# Patient Record
Sex: FEMALE | Race: WHITE | NOT HISPANIC OR LATINO | Employment: OTHER | URBAN - METROPOLITAN AREA
[De-identification: names, ages, dates, MRNs, and addresses within clinical notes are randomized per-mention and may not be internally consistent; named-entity substitution may affect disease eponyms.]

---

## 2016-04-26 LAB — THIN PREP: NOT DETECTED

## 2017-04-10 RX ORDER — LEVOTHYROXINE SODIUM 50 UG/1
50 TABLET ORAL DAILY
Qty: 30 TABLET | Refills: 0 | Status: SHIPPED | OUTPATIENT
Start: 2017-04-10 | End: 2018-09-28 | Stop reason: SDUPTHER

## 2017-04-10 RX ORDER — ATORVASTATIN CALCIUM 20 MG/1
20 TABLET, FILM COATED ORAL DAILY
Qty: 30 TABLET | Refills: 0 | Status: SHIPPED | OUTPATIENT
Start: 2017-04-10 | End: 2017-06-06 | Stop reason: SDUPTHER

## 2017-04-12 ENCOUNTER — LAB VISIT (OUTPATIENT)
Dept: LAB | Facility: HOSPITAL | Age: 52
End: 2017-04-12
Attending: INTERNAL MEDICINE
Payer: COMMERCIAL

## 2017-04-12 DIAGNOSIS — Z00.00 ANNUAL PHYSICAL EXAM: ICD-10-CM

## 2017-04-12 LAB
ALBUMIN SERPL BCP-MCNC: 4.2 G/DL
ALP SERPL-CCNC: 65 U/L
ALT SERPL W/O P-5'-P-CCNC: 15 U/L
ANION GAP SERPL CALC-SCNC: 11 MMOL/L
AST SERPL-CCNC: 22 U/L
BILIRUB SERPL-MCNC: 0.5 MG/DL
BUN SERPL-MCNC: 16 MG/DL
CALCIUM SERPL-MCNC: 10.3 MG/DL
CHLORIDE SERPL-SCNC: 102 MMOL/L
CHOLEST/HDLC SERPL: 3.6 {RATIO}
CO2 SERPL-SCNC: 27 MMOL/L
CREAT SERPL-MCNC: 0.9 MG/DL
EST. GFR  (AFRICAN AMERICAN): >60 ML/MIN/1.73 M^2
EST. GFR  (NON AFRICAN AMERICAN): >60 ML/MIN/1.73 M^2
GLUCOSE SERPL-MCNC: 94 MG/DL
HDL/CHOLESTEROL RATIO: 27.6 %
HDLC SERPL-MCNC: 203 MG/DL
HDLC SERPL-MCNC: 56 MG/DL
LDLC SERPL CALC-MCNC: 103.4 MG/DL
NONHDLC SERPL-MCNC: 147 MG/DL
POTASSIUM SERPL-SCNC: 5.1 MMOL/L
PROT SERPL-MCNC: 8.2 G/DL
SODIUM SERPL-SCNC: 140 MMOL/L
TRIGL SERPL-MCNC: 218 MG/DL
TSH SERPL DL<=0.005 MIU/L-ACNC: 3.51 UIU/ML

## 2017-04-12 PROCEDURE — 36415 COLL VENOUS BLD VENIPUNCTURE: CPT | Mod: PO

## 2017-04-12 PROCEDURE — 84443 ASSAY THYROID STIM HORMONE: CPT

## 2017-04-12 PROCEDURE — 80053 COMPREHEN METABOLIC PANEL: CPT

## 2017-04-12 PROCEDURE — 80061 LIPID PANEL: CPT

## 2017-05-17 ENCOUNTER — PATIENT OUTREACH (OUTPATIENT)
Dept: ADMINISTRATIVE | Facility: HOSPITAL | Age: 52
End: 2017-05-17

## 2017-06-01 ENCOUNTER — OFFICE VISIT (OUTPATIENT)
Dept: INTERNAL MEDICINE | Facility: CLINIC | Age: 52
End: 2017-06-01
Attending: INTERNAL MEDICINE
Payer: COMMERCIAL

## 2017-06-01 ENCOUNTER — HOSPITAL ENCOUNTER (OUTPATIENT)
Dept: CARDIOLOGY | Facility: OTHER | Age: 52
Discharge: HOME | End: 2017-06-01
Attending: INTERNAL MEDICINE
Payer: COMMERCIAL

## 2017-06-01 VITALS
HEIGHT: 65 IN | SYSTOLIC BLOOD PRESSURE: 126 MMHG | DIASTOLIC BLOOD PRESSURE: 81 MMHG | HEART RATE: 89 BPM | WEIGHT: 160.94 LBS | BODY MASS INDEX: 26.81 KG/M2 | OXYGEN SATURATION: 97 %

## 2017-06-01 DIAGNOSIS — E78.2 MIXED HYPERLIPIDEMIA: ICD-10-CM

## 2017-06-01 DIAGNOSIS — Z12.4 PAP SMEAR FOR CERVICAL CANCER SCREENING: ICD-10-CM

## 2017-06-01 DIAGNOSIS — Z23 NEED FOR TDAP VACCINATION: ICD-10-CM

## 2017-06-01 DIAGNOSIS — K21.00 GASTROESOPHAGEAL REFLUX DISEASE WITH ESOPHAGITIS: ICD-10-CM

## 2017-06-01 DIAGNOSIS — E03.9 HYPOTHYROIDISM, UNSPECIFIED TYPE: ICD-10-CM

## 2017-06-01 DIAGNOSIS — Z12.31 ENCOUNTER FOR SCREENING MAMMOGRAM FOR BREAST CANCER: ICD-10-CM

## 2017-06-01 DIAGNOSIS — Z23 NEED FOR PNEUMOCOCCAL VACCINATION: ICD-10-CM

## 2017-06-01 DIAGNOSIS — R07.89 ATYPICAL CHEST PAIN: ICD-10-CM

## 2017-06-01 DIAGNOSIS — Z00.00 ANNUAL PHYSICAL EXAM: Primary | ICD-10-CM

## 2017-06-01 PROCEDURE — 93010 ELECTROCARDIOGRAM REPORT: CPT | Mod: ,,, | Performed by: INTERNAL MEDICINE

## 2017-06-01 PROCEDURE — 93005 ELECTROCARDIOGRAM TRACING: CPT

## 2017-06-01 PROCEDURE — 99999 PR PBB SHADOW E&M-EST. PATIENT-LVL IV: CPT | Mod: PBBFAC,,, | Performed by: INTERNAL MEDICINE

## 2017-06-01 PROCEDURE — 99396 PREV VISIT EST AGE 40-64: CPT | Mod: S$GLB,,, | Performed by: INTERNAL MEDICINE

## 2017-06-01 RX ORDER — FINASTERIDE 1 MG/1
5 TABLET, FILM COATED ORAL DAILY
Refills: 11 | COMMUNITY
Start: 2017-05-01 | End: 2022-10-25 | Stop reason: ALTCHOICE

## 2017-06-01 NOTE — PATIENT INSTRUCTIONS
"  Triglycerides  Does this test have other names?  Lipid panel, fasting lipoprotein panel  What is this test?  This test measures the amount of triglycerides in your blood.  Triglycerides are one of several types of fats in your blood. Other kinds are LDL ("bad") cholesterol and HDL ("good") cholesterol.  Knowing your triglyceride level is important, especially if you have diabetes, are overweight or a smoker, or are mostly inactive. High triglyceride levels may put you at greater risk for a heart attack or stroke.    This test is part of a group of cholesterol and blood fat tests called a fasting lipoprotein panel, or lipid panel. This panel is recommended for all adults at least once every 5 years, or as recommended by your healthcare provider.  Knowing your triglyceride level helps your healthcare provider suggest healthy changes to your diet or lifestyle. If you have triglycerides that are high to very high, your provider is more likely to prescribe medicines to lower your triglycerides or your LDL cholesterol.  Why do I need this test?  You may have this test as part of a routine checkup. You may also need this test if you're overweight, drink too much alcohol, rarely exercise, or have other conditions like high blood pressure or diabetes.  If you are on cholesterol-lowering medicines, you may have this test to see how well your treatment is working.  What other tests might I have along with this test?  Your healthcare provider will order screening tests for LDL, HDL, and total cholesterol.  What do my test results mean?  Many things may affect your lab test results. These include the method each lab uses to do the test. Even if your test results are different from the normal value, you may not have a problem. To learn what the results mean for you, talk with your healthcare provider.  Results are given in milligrams per deciliter (mg/dL). Normal levels of triglycerides are less than 150 mg/dL.  Here are how " higher numbers are classified:  · 150 to 199 mg/dL: Borderline high  · 200 to 499 mg/dL: High  · 500 mg/dL and above: Very high  If you have a high triglyceride level, you have a greater risk for heart attack and stroke.  A triglyceride level above 150 mg/dL also means that you may have an increased risk for metabolic syndrome. This is a cluster of symptoms including high blood pressure, high blood sugar, and high body fat around the waist. These symptoms have been linked to increased risk for diabetes, heart disease, and stroke.  High triglycerides levels can also be caused by certain diseases or inherited conditions.  If your triglyceride level is above 200 mg/dL, your healthcare provider may recommend that you:  · Lose weight  · Limit high-fat foods containing saturated fats. These are animal fats found in meat, butter, and whole milk.  · Limit trans fats, which are found in many processed foods like chips and store-bought cookies  · Cut back on drinks with added sugars, such as soda  · Limit your alcohol intake  · Stop smoking  · Control your blood pressure  · Exercise for at least 30 minutes a day, 5 days a week  · Limit the calories from fat in your diet to 25% to 35% of your total intake  If your triglycerides are extremely high--above 500 mg/dL--you may have an added risk for problems with your pancreas. You will likely need medicine to lower your levels along with recommended changes in diet and lifestyle.  How is this test done?  The test requires a blood sample, which is drawn through a needle from a vein in your arm.  Does this test pose any risks?  Taking a blood sample with a needle carries risks that include bleeding, infection, bruising, or feeling dizzy. When the needle pricks your arm, you may feel a slight stinging sensation or pain. Afterward, the site may be slightly sore.  What might affect my test results?  Not fasting for the required length of time before the test can affect your results.  Certain medicines can affect your results, as can drinking alcohol.  How do I prepare for the test?  If you have this test as part of a cholesterol screening, you will need to not eat or drink anything but water for 9 to 14 hours before the test. In addition, be sure your healthcare provider knows about all medicines, herbs, vitamins, and supplements you are taking. This includes medicines that don't need a prescription and any illicit drugs you may use.     Date Last Reviewed: 10/9/2015  © 5743-1156 [employer]. [address] [place] [address], [place] [address]. All rights reserved. This information is not intended as a substitute for professional medical care. Always follow your healthcare professional's instructions.        Understanding Your Cholesterol Numbers  The higher your blood cholesterol, the greater your risk for heart attack (acute myocardial infarction), cardiovascular disease, or stroke. High cholesterol can cause any artery in your body to become narrow. Thats why you need to know your cholesterol level. If its high, you can take steps to bring it down. Eating the right foods and getting enough exercise can help. Some people also need medicine to control their cholesterol. Your healthcare provider can help you get started on a plan to control your cholesterol.        Blood flows easily when arteries are clear.      Less blood flows when cholesterol builds up in artery walls.   Checking your cholesterol  Your cholesterol is checked with a simple blood test. The results tell you how much cholesterol you have in your blood. Get checked as often as your healthcare provider suggests. If you have diabetes or high cholesterol, you may need your blood tested as often as every 3 months. As you work to lower your cholesterol, your numbers will change slowly. But they will change. Be patient and stay on track. Discuss your numbers with your healthcare provider.   Your total cholesterol number  A blood  test will give you a number for the total amount of cholesterol in your blood. The higher this number, the more likely it is that cholesterol will build up in your blood vessels. Even if your cholesterol is just slightly high, you are at increased risk for health problems.  My total cholesterol is: ________________  Your lipid numbers  Total cholesterol is just one part of the story. Cholesterol is made up of different kinds of fats (lipids). If your total cholesterol is high, knowing your lipid profile is important. The 2 most important lipids are HDL and LDL. Lipids are checked after you have fasted. This means you dont eat for a certain amount of time before the test is done. Along with cholesterol, triglyceride can also lead to blocked arteries. Triglycerides are another type of fat. Knowing your HDL, LDL, and triglyceride numbers, as well as your total cholesterol gives you a more complete picture of your cholesterol level:  · HDL is called the good cholesterol. It moves out of the bloodstream and does not block your blood vessels. HDL levels are affected by how much you exercise and what you eat.My HDL cholesterol is: ________________  · LDL is called the bad cholesterol. This is because it can stick to your artery walls and block blood flow. LDL levels are most affected by what you eat and by your genes.My LDL cholesterol is: ________________  · Triglyceride is a type of fat the body uses to store energy. Too much triglyceride can increase your risk for heart disease. Triglyceride levels should be under 150.My triglyceride is:  ________________  Based on your other health issues and risk factors, your healthcare provider may have specific goals for treating your cholesterol. You may need to use different kinds of medicines that work on the different types of cholesterol. Work with your provider to know what your goals of treatment are. Stick with your treatment plan to reach the goals you set.  High-risk  groups  Some people may need to take medicines called statins to control their cholesterol. This is in addition to eating a healthy diet and getting regular exercise.  Statins can help you stay healthy. They can also help prevent a heart attack or stroke. You may need to take a statin if you are in one of these groups:  · Adults who have had a heart attack or stroke. Or adults who have had peripheral vascular disease, a ministroke (transient ischemic attack), or stable or unstable angina. This group also includes people who have had a procedure to restore blood flow through a blocked artery. These procedures include percutaneous coronary intervention, angioplasty, stent, and open-heart bypass surgery.  · Adults who have diabetes. Or adults who are at higher risk of having a heart attack or stroke and have an LDL cholesterol level of 70 to 189 mg/dL  · Adults who are 21 years old or older and have an LDL cholesterol level of 190 mg/dL or higher.  If you are in a high-risk group, talk with your healthcare provider about your treatment goals. Make sure you understand why these goals are important, based on your own health history and your family history of heart disease or high cholesterol.  Make a plan to have regular cholesterol checks. You may need to fast before getting this test. Also ask your provider about any side effects your medicines may cause. Let your provider know about any side effects you have. You may need to take more than one medicine to reach the cholesterol goals that you and your provider decide on.  Date Last Reviewed: 10/1/2016  © 3651-8594 [employer]. [address] [place] [address], [place] [address]. All rights reserved. This information is not intended as a substitute for professional medical care. Always follow your healthcare professional's instructions.        GERD (Adult)    The esophagus is a tube that carries food from the mouth to the stomach. A valve at the lower end of the  "esophagus prevents stomach acid from flowing upward. When this valve doesn't work properly, stomach contents may repeatedly flow back up (reflux) into the esophagus. This is called gastroesophageal reflux disease (GERD). GERD can irritate the esophagus. It can cause problems with swallowing or breathing. In severe cases, GERD can cause recurrent pneumonia or other serious problems.  Symptoms of reflux include burning, pressure or sharp pain in the upper abdomen or mid to lower chest. The pain can spread to the neck, back, or shoulder. There may be belching, an acid taste in the back of the throat, chronic cough, or sore throat or hoarseness. GERD symptoms often occur during the day after a big meal. They can also occur at night when lying down.   Home care  Lifestyle changes can help reduce symptoms. If needed, medicines may be prescribed. Symptoms often improve with treatment, but if treatment is stopped, the symptoms often return after a few months. So most persons with GERD will need to continue treatment.  Lifestyle changes  · Limit or avoid fatty, fried, and spicy foods, as well as coffee, chocolate, mint, and foods with high acid content such as tomatoes and citrus fruit and juices (orange, grapefruit, lemon).  · Dont eat large meals, especially at night. Frequent, smaller meals are best. Do not lie down right after eating. And dont eat anything 3 hours before going to bed.  · Avoid drinking alcohol and smoking. As much as possible, stay away from second hand smoke.  · If you are overweight, losing weight will reduce symptoms.   · Avoid wearing tight clothing around your stomach area.  · If your symptoms occur during sleep, use a foam wedge to elevate your upper body (not just your head.) Or, place 4" blocks under the head of your bed.  Medicines  If needed, medicines can help relieve the symptoms of GERD and prevent damage to the esophagus. Discuss a medicine plan with your healthcare provider. This may " include one or more of the following medicines:  · Antacids to help neutralize the normal acids in your stomach.  · Acid blockers (H2 blockers) to decrease acid production.  · Acid inhibitors (PPIs) to decrease acid production in a different way than the blockers. They may work better, but can take a little longer to take effect.  Take an antacid 30-60 minutes after eating and at bedtime, but not at the same time as an acid blocker.  Try not to take medicines such as ibuprofen and aspirin. If you are taking aspirin for your heart or other medical reasons, talk to your healthcare provider about stopping it.  Follow-up care  Follow up with your healthcare provider or as advised by our staff.  When to seek medical advice  Call your healthcare provider if any of the following occur:  · Stomach pain gets worse or moves to the lower right abdomen (appendix area)  · Chest pain appears or gets worse, or spreads to the back, neck, shoulder, or arm  · Frequent vomiting (cant keep down liquids)  · Blood in the stool or vomit (red or black in color)  · Feeling weak or dizzy  · Fever of 100.4ºF (38ºC) or higher, or as directed by your healthcare provider  Date Last Reviewed: 6/23/2015  © 8239-7503 [employer]. [address] [place] [address], [place] [address]. All rights reserved. This information is not intended as a substitute for professional medical care. Always follow your healthcare professional's instructions.          pepcid AC daily may help with reflux

## 2017-06-01 NOTE — PROGRESS NOTES
"Subjective:       Patient ID: Ivonne Arevalo is a 52 y.o. female.    Chief Complaint: Annual Exam     Ivonne Arevalo is a 52 y.o.  female who presents for Annual Exam  .  Patient Active Problem List   Diagnosis    Mild hyperlipidemia    Stress incontinence    Basal cell cancer    Family history of early CAD    Family history of diabetes mellitus (DM)    Hypertriglyceridemia    Hypothyroid    Herpes zoster of eyelid    Gastroesophageal reflux disease with esophagitis    Atypical chest pain     Using a fitbit to track steps   Reports occasional "cramp" in her chest, last less than a minute. Non exertional. No associated symptoms.  Occasionally feels SOB but again not when exerting herself. No n/v. Resoves with deepbreathing and rest. Denies palpitations.      Has had some reflux after seafood. Relieved with tums. Describes a full feeling in her throat, worse with lying down.       Thyroid Problem   Presents for follow-up visit. Patient reports no depressed mood, diarrhea, dry skin, fatigue, hair loss or palpitations. The symptoms have been stable. Her past medical history is significant for hyperlipidemia.   Hyperlipidemia   This is a chronic problem. The current episode started more than 1 year ago. Pertinent negatives include no chest pain or shortness of breath. Current antihyperlipidemic treatment includes statins and diet change. Compliance problems include adherence to diet.  Risk factors for coronary artery disease include dyslipidemia, family history and post-menopausal (LDL controlled but TGL now over 100).     Health Maintenance       Date Due Completion Date    Pneumococcal PCV13 (High Risk) (1 - PCV13 Required) 01/31/1966 ---    TETANUS VACCINE 01/31/1983 ---    Pneumococcal PPSV23 (High Risk) (1) 01/31/1983 ---    Pap Smear with HPV Cotest 10/22/2016 10/22/2013    Mammogram 11/11/2016 11/11/2015 (Done)    Override on 11/11/2015: Done    Override on 10/13/2014: Done    Override on 11/14/2013: Done    " Influenza Vaccine 08/01/2017 4/21/2016 (Declined)    Override on 4/21/2016: Declined    Override on 2/24/2015: Declined    Lipid Panel 04/12/2022 4/12/2017    Colonoscopy 04/21/2026 4/21/2016 (Declined)    Override on 4/21/2016: Declined (scheduled may 3rd with dr salgado)          Review of Systems   Constitutional: Negative for chills, fatigue and fever.   HENT: Negative for rhinorrhea and sore throat.    Respiratory: Negative for cough and shortness of breath.    Cardiovascular: Negative for chest pain and palpitations.   Gastrointestinal: Negative for diarrhea, nausea and vomiting.   Genitourinary: Negative for dysuria and hematuria.   Musculoskeletal: Negative for arthralgias and back pain.   Skin: Negative for color change and rash.   Neurological: Negative for weakness and numbness.   Psychiatric/Behavioral: Negative for agitation and dysphoric mood.         Past Medical History:   Diagnosis Date    Basal cell cancer     followed by Dr Burnett    Family history of diabetes mellitus (DM)     Family history of early CAD     Mild hyperlipidemia     Stress incontinence        Past Surgical History:   Procedure Laterality Date    CERVICAL DISC ARTHROPLASTY  2008    ENDOMETRIAL ABLATION      hammertoe surgery  4/26/15    right    TOE SURGERY Right     TOTAL SHOULDER ARTHROPLASTY  2008       Family History   Problem Relation Age of Onset    Depression Mother     Hypertension Mother     Heart disease Mother     Stroke Mother     Diabetes Father     Heart disease Father     Hypertension Father     Kidney disease Father     Cataracts Father     Hyperlipidemia Sister     Arthritis Brother     Coronary artery disease Brother 42          Hypertension Brother     Heart disease Maternal Aunt     Hypertension Maternal Aunt     Cancer Maternal Uncle      unknown primary    Heart disease Maternal Uncle     Hypertension Maternal Uncle     Arthritis Maternal Grandmother     Diabetes Maternal  "Grandmother     Heart disease Maternal Grandmother     Hypertension Maternal Grandmother     Stroke Sister 50    Coronary artery disease Sister 56    Amblyopia Neg Hx     Blindness Neg Hx     Glaucoma Neg Hx     Macular degeneration Neg Hx     Strabismus Neg Hx     Retinal detachment Neg Hx        Social History   Substance Use Topics    Smoking status: Never Smoker    Smokeless tobacco: Not on file    Alcohol use 3.0 oz/week     2 Glasses of wine, 2 Cans of beer, 1 Shots of liquor per week             Objective:   Blood pressure 126/81, pulse 89, height 5' 5" (1.651 m), weight 73 kg (160 lb 15 oz), SpO2 97 %.     Physical Exam   Constitutional: She is oriented to person, place, and time. She appears well-developed and well-nourished. No distress.   HENT:   Head: Normocephalic and atraumatic.   Right Ear: External ear normal.   Left Ear: External ear normal.   Eyes: Conjunctivae are normal. No scleral icterus.   Neck: No JVD present. Carotid bruit is not present. No thyromegaly present.   Cardiovascular: Normal rate and normal heart sounds.  Exam reveals no gallop and no friction rub.    No murmur heard.  Pulmonary/Chest: Effort normal and breath sounds normal. She has no wheezes. She has no rales.   Abdominal: Soft. Bowel sounds are normal. She exhibits no distension. There is no tenderness.   Musculoskeletal: She exhibits no edema or tenderness.   Lymphadenopathy:     She has no cervical adenopathy.        Right: No supraclavicular adenopathy present.        Left: No supraclavicular adenopathy present.   Neurological: She is alert and oriented to person, place, and time. She has normal reflexes.   Skin: Skin is warm and dry.   Psychiatric: She has a normal mood and affect. Her behavior is normal. Thought content normal.       Prior labs reviewed  Assessment/Plan:        Ivonne was seen today for annual exam.    Diagnoses and all orders for this visit:    Annual physical exam  Recommend daily sunscreen, " cardiovascular exercise min 30 min 5 days per week. Seatbelts routinely.      Need for Tdap vaccination   tdap vaccine recommend to patient.     Encounter for screening mammogram for breast cancer  Pt will follow up with screening mammo through her gyn     Pap smear for cervical cancer screening  Pt follows routinely with her gyn    Mixed hyperlipidemia  -     Lipid panel; Future  -     Comprehensive metabolic panel; Future  -     Lipid panel; Future    Atypical chest pain  -     Exercise stress echo; Future  -     EKG 12-lead; Future      Gastroesophageal reflux disease with esophagitis  Lifestyle modifications    Hypothyroidism, unspecified type  -     TSH; Future  - Clinically euthyroid/stable  - cont current dose of levothyroxine  - annual TSH      Other orders  -     Cancel: Pneumococcal Conjugate Vaccine (13 Valent) (IM)  -     Cancel: Tdap Vaccine  -     Cancel: Mammo Digital Screening Bilat with Tomosynthesis CAD; Future  -     Cancel: Ambulatory referral to Obstetrics / Gynecology

## 2017-06-02 RX ORDER — LEVOTHYROXINE SODIUM 50 UG/1
TABLET ORAL
Qty: 30 TABLET | Refills: 11 | Status: SHIPPED | OUTPATIENT
Start: 2017-06-02 | End: 2018-03-16 | Stop reason: SDUPTHER

## 2017-06-06 ENCOUNTER — HOSPITAL ENCOUNTER (OUTPATIENT)
Dept: CARDIOLOGY | Facility: CLINIC | Age: 52
Discharge: HOME | End: 2017-06-06
Payer: COMMERCIAL

## 2017-06-06 DIAGNOSIS — R07.89 ATYPICAL CHEST PAIN: ICD-10-CM

## 2017-06-06 LAB
DIASTOLIC DYSFUNCTION: NO
RETIRED EF AND QEF - SEE NOTES: 60 (ref 55–65)

## 2017-06-06 PROCEDURE — 93321 DOPPLER ECHO F-UP/LMTD STD: CPT | Mod: S$GLB,,, | Performed by: INTERNAL MEDICINE

## 2017-06-06 PROCEDURE — 93351 STRESS TTE COMPLETE: CPT | Mod: S$GLB,,, | Performed by: INTERNAL MEDICINE

## 2017-06-06 RX ORDER — LEVOTHYROXINE SODIUM 50 UG/1
50 TABLET ORAL DAILY
Qty: 30 TABLET | Refills: 0 | Status: CANCELLED | OUTPATIENT
Start: 2017-06-06

## 2017-06-06 RX ORDER — ATORVASTATIN CALCIUM 20 MG/1
20 TABLET, FILM COATED ORAL DAILY
Qty: 30 TABLET | Refills: 0 | Status: CANCELLED | OUTPATIENT
Start: 2017-06-06

## 2017-06-06 RX ORDER — ATORVASTATIN CALCIUM 20 MG/1
20 TABLET, FILM COATED ORAL DAILY
Qty: 30 TABLET | Refills: 11 | Status: SHIPPED | OUTPATIENT
Start: 2017-06-06 | End: 2017-08-09 | Stop reason: SDUPTHER

## 2017-07-10 DIAGNOSIS — Z12.11 COLON CANCER SCREENING: ICD-10-CM

## 2017-08-04 DIAGNOSIS — Z12.11 COLON CANCER SCREENING: ICD-10-CM

## 2017-08-09 ENCOUNTER — PATIENT MESSAGE (OUTPATIENT)
Dept: INTERNAL MEDICINE | Facility: CLINIC | Age: 52
End: 2017-08-09

## 2017-08-09 RX ORDER — ATORVASTATIN CALCIUM 20 MG/1
20 TABLET, FILM COATED ORAL DAILY
Qty: 30 TABLET | Refills: 11 | Status: SHIPPED | OUTPATIENT
Start: 2017-08-09 | End: 2017-09-13 | Stop reason: SDUPTHER

## 2017-08-31 ENCOUNTER — LAB VISIT (OUTPATIENT)
Dept: LAB | Facility: HOSPITAL | Age: 52
End: 2017-08-31
Attending: INTERNAL MEDICINE
Payer: COMMERCIAL

## 2017-08-31 DIAGNOSIS — E03.9 HYPOTHYROIDISM, UNSPECIFIED TYPE: ICD-10-CM

## 2017-08-31 DIAGNOSIS — E78.2 MIXED HYPERLIPIDEMIA: ICD-10-CM

## 2017-08-31 LAB
ALBUMIN SERPL BCP-MCNC: 4.2 G/DL
ALP SERPL-CCNC: 72 U/L
ALT SERPL W/O P-5'-P-CCNC: 11 U/L
ANION GAP SERPL CALC-SCNC: 10 MMOL/L
AST SERPL-CCNC: 18 U/L
BILIRUB SERPL-MCNC: 0.4 MG/DL
BUN SERPL-MCNC: 13 MG/DL
CALCIUM SERPL-MCNC: 10 MG/DL
CHLORIDE SERPL-SCNC: 105 MMOL/L
CHOLEST SERPL-MCNC: 184 MG/DL
CHOLEST/HDLC SERPL: 3.8 {RATIO}
CO2 SERPL-SCNC: 24 MMOL/L
CREAT SERPL-MCNC: 0.9 MG/DL
EST. GFR  (AFRICAN AMERICAN): >60 ML/MIN/1.73 M^2
EST. GFR  (NON AFRICAN AMERICAN): >60 ML/MIN/1.73 M^2
GLUCOSE SERPL-MCNC: 100 MG/DL
HDLC SERPL-MCNC: 49 MG/DL
HDLC SERPL: 26.6 %
LDLC SERPL CALC-MCNC: 94.6 MG/DL
NONHDLC SERPL-MCNC: 135 MG/DL
POTASSIUM SERPL-SCNC: 4.8 MMOL/L
PROT SERPL-MCNC: 8 G/DL
SODIUM SERPL-SCNC: 139 MMOL/L
TRIGL SERPL-MCNC: 202 MG/DL
TSH SERPL DL<=0.005 MIU/L-ACNC: 2.53 UIU/ML

## 2017-08-31 PROCEDURE — 36415 COLL VENOUS BLD VENIPUNCTURE: CPT | Mod: PO

## 2017-08-31 PROCEDURE — 80061 LIPID PANEL: CPT

## 2017-08-31 PROCEDURE — 84443 ASSAY THYROID STIM HORMONE: CPT

## 2017-08-31 PROCEDURE — 80053 COMPREHEN METABOLIC PANEL: CPT

## 2017-09-01 ENCOUNTER — PATIENT MESSAGE (OUTPATIENT)
Dept: INTERNAL MEDICINE | Facility: CLINIC | Age: 52
End: 2017-09-01

## 2017-09-13 ENCOUNTER — PATIENT MESSAGE (OUTPATIENT)
Dept: INTERNAL MEDICINE | Facility: CLINIC | Age: 52
End: 2017-09-13

## 2017-09-13 RX ORDER — ATORVASTATIN CALCIUM 20 MG/1
20 TABLET, FILM COATED ORAL DAILY
Qty: 30 TABLET | Refills: 11 | Status: SHIPPED | OUTPATIENT
Start: 2017-09-13 | End: 2018-09-28 | Stop reason: SDUPTHER

## 2017-09-14 ENCOUNTER — PATIENT MESSAGE (OUTPATIENT)
Dept: INTERNAL MEDICINE | Facility: CLINIC | Age: 52
End: 2017-09-14

## 2018-03-16 RX ORDER — LEVOTHYROXINE SODIUM 50 UG/1
TABLET ORAL
Qty: 90 TABLET | Refills: 3 | Status: SHIPPED | OUTPATIENT
Start: 2018-03-16 | End: 2018-06-04 | Stop reason: SDUPTHER

## 2018-04-23 ENCOUNTER — PATIENT MESSAGE (OUTPATIENT)
Dept: INTERNAL MEDICINE | Facility: CLINIC | Age: 53
End: 2018-04-23

## 2018-04-24 ENCOUNTER — TELEPHONE (OUTPATIENT)
Dept: INTERNAL MEDICINE | Facility: CLINIC | Age: 53
End: 2018-04-24

## 2018-04-24 ENCOUNTER — PATIENT MESSAGE (OUTPATIENT)
Dept: INTERNAL MEDICINE | Facility: CLINIC | Age: 53
End: 2018-04-24

## 2018-04-24 DIAGNOSIS — Z00.00 ANNUAL PHYSICAL EXAM: Primary | ICD-10-CM

## 2018-04-24 NOTE — TELEPHONE ENCOUNTER
Schedule this pt annual visit with Dr. Holman 6/4/18 and schedule her lab work 5/28/18. Sent this pt a message thru the portal to inform her of hert appt

## 2018-04-25 ENCOUNTER — PATIENT MESSAGE (OUTPATIENT)
Dept: INTERNAL MEDICINE | Facility: CLINIC | Age: 53
End: 2018-04-25

## 2018-05-21 ENCOUNTER — PATIENT OUTREACH (OUTPATIENT)
Dept: ADMINISTRATIVE | Facility: HOSPITAL | Age: 53
End: 2018-05-21

## 2018-05-21 ENCOUNTER — PATIENT MESSAGE (OUTPATIENT)
Dept: ADMINISTRATIVE | Facility: HOSPITAL | Age: 53
End: 2018-05-21

## 2018-05-21 ENCOUNTER — PATIENT MESSAGE (OUTPATIENT)
Dept: INTERNAL MEDICINE | Facility: CLINIC | Age: 53
End: 2018-05-21

## 2018-05-21 ENCOUNTER — TELEPHONE (OUTPATIENT)
Dept: INTERNAL MEDICINE | Facility: CLINIC | Age: 53
End: 2018-05-21

## 2018-05-21 NOTE — PROGRESS NOTES
[facility] is committed to your overall health.  To help you get the most out of each of your visits, we will review your information to make sure you are up to date on all of your recommended tests and/or procedures.       Your PCP  Shari Holman MD   found that you may be due for:     Health Maintenance Due   Topic Date Due    Pneumococcal PCV13 (High Risk) (1 - PCV13 Required) 01/31/1966    TETANUS VACCINE  01/31/1983    Mammogram  12/14/2017     Please be prepared to sign release of information so that we obtain medical records for care that you received outside of [facility]. This is done to make sure your medical record is complete and that you receive the best of care. Thank you.       If you have had any of the above done at another facility, please bring the records or information with you so that your record at [facility] will be complete.  If you would like to schedule any of these, please contact me.     If you are currently taking medication, please bring it with you to your appointment for review.     Also, if you have any type of Advanced Directives, please bring them with you to your office visit so we may scan them into your chart.       Thank you for Choosing [facility] for your healthcare needs.        Additional Information  If you have questions, you can email [email or web address] or call [phone]  to talk to our [facility] staff. Remember, [facility] is NOT to be used for urgent needs. For medical emergencies, dial 911.

## 2018-05-21 NOTE — TELEPHONE ENCOUNTER
Please call and find out more details about her fall.  Find out if she is having any pain with walking or in the area, offer her an urgent care appointment.

## 2018-05-21 NOTE — TELEPHONE ENCOUNTER
Spoke w/ pt she states that its a lil soreness when walking. She states that she feel off the pool deck, she is not in much pain, only when she walk . Pt rated pain level when walking a 3 or 4.  An urgent appt has been scheduled for pt to be eval here in clinic so further testing will be discussed at that appt   Pt agree and has no further questions

## 2018-05-30 ENCOUNTER — LAB VISIT (OUTPATIENT)
Dept: LAB | Facility: HOSPITAL | Age: 53
End: 2018-05-30
Attending: INTERNAL MEDICINE
Payer: COMMERCIAL

## 2018-05-30 DIAGNOSIS — Z00.00 ANNUAL PHYSICAL EXAM: ICD-10-CM

## 2018-05-30 LAB
ALBUMIN SERPL BCP-MCNC: 4.2 G/DL
ALP SERPL-CCNC: 77 U/L
ALT SERPL W/O P-5'-P-CCNC: 12 U/L
ANION GAP SERPL CALC-SCNC: 9 MMOL/L
AST SERPL-CCNC: 18 U/L
BASOPHILS # BLD AUTO: 0.05 K/UL
BASOPHILS NFR BLD: 0.6 %
BILIRUB SERPL-MCNC: 0.6 MG/DL
BUN SERPL-MCNC: 14 MG/DL
CALCIUM SERPL-MCNC: 10.2 MG/DL
CHLORIDE SERPL-SCNC: 103 MMOL/L
CHOLEST SERPL-MCNC: 190 MG/DL
CHOLEST/HDLC SERPL: 3.5 {RATIO}
CO2 SERPL-SCNC: 25 MMOL/L
CREAT SERPL-MCNC: 0.8 MG/DL
DIFFERENTIAL METHOD: ABNORMAL
EOSINOPHIL # BLD AUTO: 0.1 K/UL
EOSINOPHIL NFR BLD: 1.1 %
ERYTHROCYTE [DISTWIDTH] IN BLOOD BY AUTOMATED COUNT: 11.8 %
EST. GFR  (AFRICAN AMERICAN): >60 ML/MIN/1.73 M^2
EST. GFR  (NON AFRICAN AMERICAN): >60 ML/MIN/1.73 M^2
ESTIMATED AVG GLUCOSE: 108 MG/DL
GLUCOSE SERPL-MCNC: 90 MG/DL
HBA1C MFR BLD HPLC: 5.4 %
HCT VFR BLD AUTO: 42.7 %
HDLC SERPL-MCNC: 54 MG/DL
HDLC SERPL: 28.4 %
HGB BLD-MCNC: 14.2 G/DL
IMM GRANULOCYTES # BLD AUTO: 0.04 K/UL
IMM GRANULOCYTES NFR BLD AUTO: 0.5 %
LDLC SERPL CALC-MCNC: 98 MG/DL
LYMPHOCYTES # BLD AUTO: 2.4 K/UL
LYMPHOCYTES NFR BLD: 27.3 %
MCH RBC QN AUTO: 32.3 PG
MCHC RBC AUTO-ENTMCNC: 33.3 G/DL
MCV RBC AUTO: 97 FL
MONOCYTES # BLD AUTO: 0.7 K/UL
MONOCYTES NFR BLD: 7.4 %
NEUTROPHILS # BLD AUTO: 5.6 K/UL
NEUTROPHILS NFR BLD: 63.1 %
NONHDLC SERPL-MCNC: 136 MG/DL
NRBC BLD-RTO: 0 /100 WBC
PLATELET # BLD AUTO: 462 K/UL
PMV BLD AUTO: 9.6 FL
POTASSIUM SERPL-SCNC: 4.5 MMOL/L
PROT SERPL-MCNC: 7.9 G/DL
RBC # BLD AUTO: 4.39 M/UL
SODIUM SERPL-SCNC: 137 MMOL/L
TRIGL SERPL-MCNC: 190 MG/DL
TSH SERPL DL<=0.005 MIU/L-ACNC: 3.07 UIU/ML
WBC # BLD AUTO: 8.83 K/UL

## 2018-05-30 PROCEDURE — 80053 COMPREHEN METABOLIC PANEL: CPT

## 2018-05-30 PROCEDURE — 83036 HEMOGLOBIN GLYCOSYLATED A1C: CPT

## 2018-05-30 PROCEDURE — 85025 COMPLETE CBC W/AUTO DIFF WBC: CPT

## 2018-05-30 PROCEDURE — 80061 LIPID PANEL: CPT

## 2018-05-30 PROCEDURE — 36415 COLL VENOUS BLD VENIPUNCTURE: CPT | Mod: PO

## 2018-05-30 PROCEDURE — 84443 ASSAY THYROID STIM HORMONE: CPT

## 2018-06-04 ENCOUNTER — OFFICE VISIT (OUTPATIENT)
Dept: INTERNAL MEDICINE | Facility: CLINIC | Age: 53
End: 2018-06-04
Attending: INTERNAL MEDICINE
Payer: COMMERCIAL

## 2018-06-04 ENCOUNTER — LAB VISIT (OUTPATIENT)
Dept: LAB | Facility: OTHER | Age: 53
End: 2018-06-04
Attending: INTERNAL MEDICINE
Payer: COMMERCIAL

## 2018-06-04 VITALS
HEIGHT: 65 IN | WEIGHT: 162.5 LBS | BODY MASS INDEX: 27.07 KG/M2 | SYSTOLIC BLOOD PRESSURE: 126 MMHG | HEART RATE: 82 BPM | OXYGEN SATURATION: 97 % | DIASTOLIC BLOOD PRESSURE: 80 MMHG

## 2018-06-04 DIAGNOSIS — T14.8XXA BRUISING: ICD-10-CM

## 2018-06-04 DIAGNOSIS — E03.4 HYPOTHYROIDISM DUE TO ACQUIRED ATROPHY OF THYROID: ICD-10-CM

## 2018-06-04 DIAGNOSIS — R04.0 EPISTAXIS: ICD-10-CM

## 2018-06-04 DIAGNOSIS — D75.839 THROMBOCYTOSIS: ICD-10-CM

## 2018-06-04 DIAGNOSIS — Z00.00 ANNUAL PHYSICAL EXAM: Primary | ICD-10-CM

## 2018-06-04 DIAGNOSIS — E78.1 HYPERTRIGLYCERIDEMIA: ICD-10-CM

## 2018-06-04 LAB
APTT BLDCRRT: 31.9 SEC
INR PPP: 1
PLATELET FUNCTION ASSAY - EPINEPHRINE: 120 SECS
PROTHROMBIN TIME: 11.4 SEC

## 2018-06-04 PROCEDURE — 3008F BODY MASS INDEX DOCD: CPT | Mod: CPTII,S$GLB,, | Performed by: INTERNAL MEDICINE

## 2018-06-04 PROCEDURE — 99396 PREV VISIT EST AGE 40-64: CPT | Mod: S$GLB,,, | Performed by: INTERNAL MEDICINE

## 2018-06-04 PROCEDURE — 85730 THROMBOPLASTIN TIME PARTIAL: CPT

## 2018-06-04 PROCEDURE — 85576 BLOOD PLATELET AGGREGATION: CPT

## 2018-06-04 PROCEDURE — 85610 PROTHROMBIN TIME: CPT

## 2018-06-04 PROCEDURE — 99213 OFFICE O/P EST LOW 20 MIN: CPT | Mod: 25,S$GLB,, | Performed by: INTERNAL MEDICINE

## 2018-06-04 PROCEDURE — 99999 PR PBB SHADOW E&M-EST. PATIENT-LVL III: CPT | Mod: PBBFAC,,, | Performed by: INTERNAL MEDICINE

## 2018-06-04 PROCEDURE — 36415 COLL VENOUS BLD VENIPUNCTURE: CPT

## 2018-06-04 NOTE — PROGRESS NOTES
"Subjective:       Patient ID: Ivonne Arevalo is a 53 y.o. female.    Chief Complaint: Annual Exam     Ivonne Arevalo is a 53 y.o.  female who presents for Annual Exam  .  Patient Active Problem List   Diagnosis    Mild hyperlipidemia    Stress incontinence    Basal cell cancer    Family history of early CAD    Family history of diabetes mellitus (DM)    Hypertriglyceridemia    Hypothyroid    Herpes zoster of eyelid    Gastroesophageal reflux disease with esophagitis    Atypical chest pain     Large hemotoma after fall around pool.  Bruising was extensive across whole thigh. Has improved but area still a "lump" and tender.   Reports frequent bruising and occasional bleeding and over the last few months she feels this has worsened.. Suffers with occasional epistaxis.Most recent epistaxis one month ago, severe while in shower.She has noticed significant bruising over last year,recently on foot prior and prior one on breast, both at least 4"-5" and she does not recall any trauma.  Has history of mild thrombocytosis on her labs persistently.  No family history of bleeding disorder.  LMP 2003 just prior to endometrial ablation.     Patient has a history of hyperlipidemia. Pt is not on medication. Denies muscle cramping and weakness. Pt attempts to follow a low cholesterol diet and exercise. No CP, SOB, orthopnea or PND.         Thyroid Problem   Presents for follow-up visit. Patient reports no constipation, depressed mood, diarrhea, menstrual problem, palpitations or weight loss. The symptoms have been stable.     Health Maintenance       Date Due Completion Date    Pneumococcal PCV13 (High Risk) (1 - PCV13 Required) 01/31/1966 ---    TETANUS VACCINE 01/31/1983 ---    Pneumococcal PPSV23 (High Risk) (1) 05/21/2019 (Originally 1/31/1983) ---    Influenza Vaccine 08/01/2018 4/21/2016 (Declined)    Override on 4/21/2016: Declined    Override on 2/24/2015: Declined    Mammogram 12/18/2018 12/18/2017 (Done)    Override on " 12/18/2017: Done (done at [facility])    Override on 12/14/2016: Done (at [facility])    Override on 11/11/2015: Done    Override on 10/13/2014: Done    Override on 11/14/2013: Done    Pap Smear with HPV Cotest 04/27/2019 4/27/2016 (Done)    Override on 4/27/2016: Done    Override on 11/14/2013: Done    Lipid Panel 05/30/2023 5/30/2018    Colonoscopy 05/03/2026 5/3/2016    Override on 4/21/2016: Declined (scheduled may 3rd with dr salgado)          Review of Systems   Constitutional: Negative for activity change, unexpected weight change and weight loss.   HENT: Positive for nosebleeds. Negative for hearing loss, rhinorrhea and trouble swallowing.         No gingival bleeding   Eyes: Negative for discharge and visual disturbance.   Respiratory: Negative for chest tightness and wheezing.    Cardiovascular: Negative for chest pain and palpitations.   Gastrointestinal: Negative for anal bleeding, blood in stool, constipation, diarrhea and vomiting.   Endocrine: Negative for polydipsia and polyuria.   Genitourinary: Negative for difficulty urinating, dysuria, hematuria, menstrual problem and vaginal bleeding.   Musculoskeletal: Negative for arthralgias, joint swelling and neck pain.   Skin:        + significant bruising   Neurological: Negative for weakness and headaches.   Hematological: Negative for adenopathy. Bruises/bleeds easily.   Psychiatric/Behavioral: Negative for confusion and dysphoric mood.         Past Medical History:   Diagnosis Date    Basal cell cancer     followed by Dr Burnett    Family history of diabetes mellitus (DM)     Family history of early CAD     Mild hyperlipidemia     Stress incontinence        Past Surgical History:   Procedure Laterality Date    CERVICAL DISC ARTHROPLASTY  2008    ENDOMETRIAL ABLATION      hammertoe surgery  4/26/15    right    TOE SURGERY Right     TOTAL SHOULDER ARTHROPLASTY  2008       Family History   Problem Relation Age of Onset    Depression Mother   "   Hypertension Mother     Heart disease Mother     Stroke Mother     Diabetes Father     Heart disease Father     Hypertension Father     Kidney disease Father     Cataracts Father     Hyperlipidemia Sister     Arthritis Brother     Coronary artery disease Brother 42             Hypertension Brother     Heart disease Maternal Aunt     Hypertension Maternal Aunt     Cancer Maternal Uncle         unknown primary    Heart disease Maternal Uncle     Hypertension Maternal Uncle     Arthritis Maternal Grandmother     Diabetes Maternal Grandmother     Heart disease Maternal Grandmother     Hypertension Maternal Grandmother     Stroke Sister 50    Coronary artery disease Sister 56    Amblyopia Neg Hx     Blindness Neg Hx     Glaucoma Neg Hx     Macular degeneration Neg Hx     Strabismus Neg Hx     Retinal detachment Neg Hx        Social History   Substance Use Topics    Smoking status: Never Smoker    Smokeless tobacco: Not on file    Alcohol use 3.0 oz/week     2 Glasses of wine, 2 Cans of beer, 1 Shots of liquor per week             Objective:   Blood pressure 126/80, pulse 82, height 5' 5" (1.651 m), weight 73.7 kg (162 lb 7.7 oz), SpO2 97 %.     Physical Exam   Constitutional: She is oriented to person, place, and time. She appears well-developed and well-nourished. No distress.   HENT:   Head: Normocephalic and atraumatic.   Nose: Nose normal.   Mouth/Throat: Oropharynx is clear and moist.   Gums normal, no bleeding, no mucosal petcheia   Eyes: Conjunctivae are normal. No scleral icterus.   Neck: Carotid bruit is not present. No thyromegaly present.   Cardiovascular: Normal rate and normal heart sounds.  Exam reveals no gallop and no friction rub.    No murmur heard.  Pulmonary/Chest: Effort normal and breath sounds normal. She has no wheezes. She has no rales.   Abdominal: Soft. Bowel sounds are normal. She exhibits no distension. There is no tenderness.   Musculoskeletal: She exhibits " no edema or tenderness.   Lymphadenopathy:     She has no cervical adenopathy.        Right: No supraclavicular adenopathy present.        Left: No supraclavicular adenopathy present.   Neurological: She is alert and oriented to person, place, and time. She has normal reflexes.   Skin: Skin is warm and dry. She is not diaphoretic.   Palpable hematoma right thigh, bruising almost completely resolved    No other bruising or petchia noted   Psychiatric: She has a normal mood and affect. Her behavior is normal.       Prior labs reviewed  Assessment/Plan:        Ivonne was seen today for annual exam.    Diagnoses and all orders for this visit:    Annual physical exam  Recommend daily sunscreen, cardiovascular exercise min 30 min 5 days per week. Seatbelts routinely.  Recommend monthly self breast exams and annual mammogram.  Also screening  pap with reflex HPV q 3 years or as recommended by gyn provider.    Bruising  -     Protime-INR; Future  -     APTT; Future  -     PLATELET FUNCTION ASSAY-EPI; Future  -     Ambulatory consult to Hematology / Oncology    Epistaxis  -     Protime-INR; Future  -     APTT; Future  -     PLATELET FUNCTION ASSAY-EPI; Future  -     Ambulatory consult to Hematology / Oncology    Thrombocytosis  -     Protime-INR; Future  -     APTT; Future  -     PLATELET FUNCTION ASSAY-EPI; Future  -     Ambulatory consult to Hematology / Oncology    Hypothyroidism due to acquired atrophy of thyroid  - Clinically euthyroid/stable  - cont current dose of levothyroxine  - annual TSH    hypertryglyceridemia  Mild, recommend diet changes and exercise    -     Cancel: Mammo Digital Screening Bilat with Tomosynthesis CAD; Future

## 2018-06-11 NOTE — PROGRESS NOTES
CC:  Epistaxis, bruising, thrombocytosis    HPI:  Ms Arevalo is a 52 yo woman who presents today for further evaluation of easy bruising, epistaxis, and thrombocytosis. She has had easy bruising for many years, but notices it is getting worse more recently. Also has nose bleeds every now and then which usually takes 15-20 minutes to stop. Had cervical spine surgery 10 years ago without complications.  Plt count was 415 in 2011, 437 on 12/10/2013. On 5/30/2018, WBC 8.83, Hb 14.2, Hct 42.7, plt count 462. CMP normal. On 6/4/2018, INR, APTT normal. Plt function test normal.     She is not taking any aspirin or herbal meds. Takes [facility] multivitamins.       Past Medical History:   Diagnosis Date    Basal cell cancer     followed by Dr Burnett    Family history of diabetes mellitus (DM)     Family history of early CAD     Mild hyperlipidemia     Stress incontinence          Family History   Problem Relation Age of Onset    Depression Mother     Hypertension Mother     Heart disease Mother     Stroke Mother     Diabetes Father     Heart disease Father     Hypertension Father     Kidney disease Father     Cataracts Father     Hyperlipidemia Sister     Arthritis Brother     Coronary artery disease Brother 42             Hypertension Brother     Heart disease Maternal Aunt     Hypertension Maternal Aunt     Cancer Maternal Uncle         unknown primary    Heart disease Maternal Uncle     Hypertension Maternal Uncle     Arthritis Maternal Grandmother     Diabetes Maternal Grandmother     Heart disease Maternal Grandmother     Hypertension Maternal Grandmother     Stroke Sister 50    Coronary artery disease Sister 56    Amblyopia Neg Hx     Blindness Neg Hx     Glaucoma Neg Hx     Macular degeneration Neg Hx     Strabismus Neg Hx     Retinal detachment Neg Hx        Past Surgical History:   Procedure Laterality Date    CERVICAL DISC ARTHROPLASTY  2008    ENDOMETRIAL ABLATION      beau  surgery  4/26/15    right    TOE SURGERY Right     TOTAL SHOULDER ARTHROPLASTY  2008       Social History     Social History    Marital status:      Spouse name: N/A    Number of children: N/A    Years of education: 2     Occupational History    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   HENT: Positive for nosebleeds. Negative for mouth sores, tinnitus, trouble swallowing and voice change.    Eyes: Negative for pain, redness and visual disturbance.   Respiratory: Negative for cough, shortness of breath and wheezing.    Cardiovascular: Negative for chest pain, palpitations and leg swelling.   Gastrointestinal: Negative for abdominal pain, blood in stool, constipation, diarrhea, nausea and vomiting.   Endocrine: Negative for polydipsia, polyphagia and polyuria.   Genitourinary: Negative for frequency and hematuria.   Musculoskeletal: Negative for arthralgias, back pain, gait problem, joint swelling, myalgias, neck pain and neck stiffness.   Skin: Negative for color change, pallor, rash and wound.        Large hematoma over right thigh after she fell   Neurological: Negative for dizziness, tremors, seizures, syncope, speech difficulty, weakness, numbness and headaches.   Hematological: Negative for adenopathy. Bruises/bleeds easily.   Psychiatric/Behavioral: Negative for confusion, dysphoric mood and self-injury. The patient is not nervous/anxious.         Objective:  Physical Exam   Constitutional: She is oriented to person, place, and time. She appears well-developed and well-nourished. No distress.   HENT:   Head: Normocephalic and atraumatic.   Mouth/Throat: Oropharynx is clear and moist. No oropharyngeal exudate.   Eyes: Conjunctivae and EOM are normal. Pupils are equal, round, and reactive to light. No scleral icterus.   Neck: Normal range of motion. Neck supple. No JVD present. No thyromegaly present.   Cardiovascular: Normal rate, regular rhythm, normal heart sounds and intact distal pulses.  Exam reveals no gallop and no friction rub.    No murmur heard.  Pulmonary/Chest: Effort normal and breath sounds normal. No respiratory distress. She has no wheezes. She has no rales.   Abdominal: Soft. Bowel sounds are normal. She exhibits no distension and no mass. There is no hepatosplenomegaly. There is no tenderness. No hernia.   Musculoskeletal: Normal range of motion. She exhibits no edema, tenderness or deformity.   Lymphadenopathy:     She has no cervical adenopathy.   Neurological: She is alert and oriented to person, place, and time. No cranial nerve deficit. She exhibits normal muscle tone.   Skin: Skin is warm and dry. No rash noted. She is not diaphoretic.   Psychiatric: She has a normal mood and affect. Her behavior is normal. Judgment and thought content normal.   Vitals reviewed.      Labs:  Plt count was 415 in 2011, 437 on 12/10/2013. On 5/30/2018, WBC 8.83, Hb 14.2, Hct 42.7, plt count 462. CMP normal. On 6/4/2018, INR, APTT normal. Plt function test normal.     Assessment and Plan:  1. Easy bruising    2. Epistaxis    3. Thrombocytosis      1.2  - discussed with patient that plt function test is very sensitive in terms of detecting platelet function abnormalities. Normal plt function test result means that her plt function is normal  - check vWF antigen and activity    3.  - Long discussions regarding her elevated plt counts. It has been  elevated since 2011. Discussed that common causes include iron deficiency, inflammation, or underlying bone marrow disease such as essential thrombocythemia. Discuss that we will check iron studies, ESR, CRP, BCR/ABL, JAK2 , MPL, MELISSA today. Discussed that 10% of patients with ET will have negative JAK2, MPL, MELISSA mutations, and the only way to diagnose it is to do a bone marrow biopsy. If she had ET, the treatment will likely be daily aspirin given her low risk and negative history of blood clots in the past. However, aspirin may worsen her bruising. After discussion, Ms. Arevalo would like to proceed with bone marrow biopsy to al for MPN. She is going out of town next week. Will schedule it the last week of June  - labs today. Return to see me in July to discuss the bone marrow biopsy results.       Distress Screening Results: Psychosocial Distress screening score of Distress Score: 0 noted and reviewed. No intervention indicated.

## 2018-06-14 ENCOUNTER — TELEPHONE (OUTPATIENT)
Dept: HEMATOLOGY/ONCOLOGY | Facility: CLINIC | Age: 53
End: 2018-06-14

## 2018-06-14 ENCOUNTER — LAB VISIT (OUTPATIENT)
Dept: LAB | Facility: OTHER | Age: 53
End: 2018-06-14
Attending: INTERNAL MEDICINE
Payer: COMMERCIAL

## 2018-06-14 ENCOUNTER — INITIAL CONSULT (OUTPATIENT)
Dept: HEMATOLOGY/ONCOLOGY | Facility: CLINIC | Age: 53
End: 2018-06-14
Attending: INTERNAL MEDICINE
Payer: COMMERCIAL

## 2018-06-14 VITALS
HEIGHT: 65 IN | OXYGEN SATURATION: 97 % | RESPIRATION RATE: 18 BRPM | HEART RATE: 86 BPM | TEMPERATURE: 98 F | DIASTOLIC BLOOD PRESSURE: 70 MMHG | BODY MASS INDEX: 26.89 KG/M2 | SYSTOLIC BLOOD PRESSURE: 126 MMHG | WEIGHT: 161.38 LBS

## 2018-06-14 DIAGNOSIS — R04.0 EPISTAXIS: ICD-10-CM

## 2018-06-14 DIAGNOSIS — D75.839 THROMBOCYTOSIS: Primary | ICD-10-CM

## 2018-06-14 DIAGNOSIS — R23.3 EASY BRUISING: Primary | ICD-10-CM

## 2018-06-14 DIAGNOSIS — R23.3 EASY BRUISING: ICD-10-CM

## 2018-06-14 DIAGNOSIS — D75.839 THROMBOCYTOSIS: ICD-10-CM

## 2018-06-14 LAB
CRP SERPL-MCNC: 4.3 MG/L
ERYTHROCYTE [SEDIMENTATION RATE] IN BLOOD: 27 MM/HR
FERRITIN SERPL-MCNC: 332 NG/ML
IRON SERPL-MCNC: 93 UG/DL
SATURATED IRON: 25 %
TOTAL IRON BINDING CAPACITY: 371 UG/DL
TRANSFERRIN SERPL-MCNC: 251 MG/DL

## 2018-06-14 PROCEDURE — 85651 RBC SED RATE NONAUTOMATED: CPT

## 2018-06-14 PROCEDURE — 85246 CLOT FACTOR VIII VW ANTIGEN: CPT

## 2018-06-14 PROCEDURE — 99204 OFFICE O/P NEW MOD 45 MIN: CPT | Mod: S$GLB,,, | Performed by: INTERNAL MEDICINE

## 2018-06-14 PROCEDURE — 86140 C-REACTIVE PROTEIN: CPT

## 2018-06-14 PROCEDURE — 88271 CYTOGENETICS DNA PROBE: CPT

## 2018-06-14 PROCEDURE — 36415 COLL VENOUS BLD VENIPUNCTURE: CPT

## 2018-06-14 PROCEDURE — 83540 ASSAY OF IRON: CPT

## 2018-06-14 PROCEDURE — 81270 JAK2 GENE: CPT

## 2018-06-14 PROCEDURE — 82728 ASSAY OF FERRITIN: CPT

## 2018-06-14 PROCEDURE — 85397 CLOTTING FUNCT ACTIVITY: CPT

## 2018-06-14 PROCEDURE — 3008F BODY MASS INDEX DOCD: CPT | Mod: CPTII,S$GLB,, | Performed by: INTERNAL MEDICINE

## 2018-06-14 PROCEDURE — 99999 PR PBB SHADOW E&M-EST. PATIENT-LVL III: CPT | Mod: PBBFAC,,, | Performed by: INTERNAL MEDICINE

## 2018-06-14 NOTE — LETTER
June 14, 2018      Shari Holman MD  [address]  [facility] [address]           [facility]  [address], [address]  [facility] [address]  Phone: [phone]          Patient: Ivonne Arevalo   MR Number: [id number]   YOB: 1965   Date of Visit: 6/14/2018       Dear Dr. Shari Holman:    Thank you for referring Ivonne Arevalo to me for evaluation. Attached you will find relevant portions of my assessment and plan of care.    If you have questions, please do not hesitate to call me. I look forward to following Ivonne Arevalo along with you.    Sincerely,    Carrington Loco MD    Enclosure  CC:  No Recipients    If you would like to receive this communication electronically, please contact [email or web address] or [phone] to request more information on [employer] Link access.    For providers and/or their staff who would like to refer a patient to [facility], please contact us through our one-stop-shop provider referral line, [facility], at [phone].    If you feel you have received this communication in error or would no longer like to receive these types of communications, please e-mail [email or web address]

## 2018-06-14 NOTE — Clinical Note
Labs today: ferritin, iron, ESR, CRP, vWF antigen, vWF activity, BCR/ABL, JAK2 reflex Bone marrow biopsy under sedation the last week of June [facility] on 7/5 to discuss biopsy result

## 2018-06-16 LAB
VWF AG ACT/NOR PPP IA: 87 %
VWF:AC ACT/NOR PPP IA: 66 %

## 2018-06-20 LAB
CLINICAL CYTOGENETICIST REVIEW: NORMAL
MBCR DISCLAIMER: NORMAL
MBCR REASON FOR REFERRAL: NORMAL
MBCR RELEASED BY: NORMAL
MBCR RESULT SUMMARY: NORMAL
MBCR RESULT TABLE: NORMAL
MBCR SPECIMEN: NORMAL
REF LAB TEST METHOD: NORMAL
SERVICE CMNT-IMP: NORMAL
SPECIMEN SOURCE: NORMAL
T(9;22)(ABL1,BCR) BLD/T FISH: NORMAL

## 2018-06-21 LAB
MPNR  FINAL DIAGNOSIS: NORMAL
MPNR  SPECIMEN TYPE: NORMAL
MPNR RESULT: NORMAL

## 2018-06-28 ENCOUNTER — ANESTHESIA EVENT (OUTPATIENT)
Dept: SURGERY | Facility: HOSPITAL | Age: 53
End: 2018-06-28
Payer: COMMERCIAL

## 2018-06-28 ENCOUNTER — ANESTHESIA (OUTPATIENT)
Dept: SURGERY | Facility: HOSPITAL | Age: 53
End: 2018-06-28
Payer: COMMERCIAL

## 2018-06-28 ENCOUNTER — HOSPITAL ENCOUNTER (OUTPATIENT)
Facility: HOSPITAL | Age: 53
Discharge: HOME | End: 2018-06-28
Attending: INTERNAL MEDICINE | Admitting: INTERNAL MEDICINE
Payer: COMMERCIAL

## 2018-06-28 VITALS
DIASTOLIC BLOOD PRESSURE: 77 MMHG | RESPIRATION RATE: 16 BRPM | TEMPERATURE: 97 F | OXYGEN SATURATION: 100 % | SYSTOLIC BLOOD PRESSURE: 122 MMHG | HEIGHT: 65 IN | HEART RATE: 73 BPM | BODY MASS INDEX: 26.82 KG/M2 | WEIGHT: 161 LBS

## 2018-06-28 DIAGNOSIS — D75.839 THROMBOCYTOSIS: Primary | ICD-10-CM

## 2018-06-28 LAB
BONE MARROW IRON STAIN COMMENT: NORMAL
BONE MARROW WRIGHT STAIN COMMENT: NORMAL

## 2018-06-28 PROCEDURE — 71000015 HC POSTOP RECOV 1ST HR: Performed by: INTERNAL MEDICINE

## 2018-06-28 PROCEDURE — 88189 FLOWCYTOMETRY/READ 16 & >: CPT | Mod: ,,, | Performed by: PATHOLOGY

## 2018-06-28 PROCEDURE — 88342 IMHCHEM/IMCYTCHM 1ST ANTB: CPT | Performed by: PATHOLOGY

## 2018-06-28 PROCEDURE — 37000008 HC ANESTHESIA 1ST 15 MINUTES: Performed by: INTERNAL MEDICINE

## 2018-06-28 PROCEDURE — 38222 DX BONE MARROW BX & ASPIR: CPT | Mod: LT,,, | Performed by: NURSE PRACTITIONER

## 2018-06-28 PROCEDURE — 88313 SPECIAL STAINS GROUP 2: CPT | Mod: 26,,, | Performed by: PATHOLOGY

## 2018-06-28 PROCEDURE — 88237 TISSUE CULTURE BONE MARROW: CPT

## 2018-06-28 PROCEDURE — 63600175 PHARM REV CODE 636 W HCPCS: Performed by: NURSE ANESTHETIST, CERTIFIED REGISTERED

## 2018-06-28 PROCEDURE — D9220A PRA ANESTHESIA: Mod: CRNA,,, | Performed by: NURSE ANESTHETIST, CERTIFIED REGISTERED

## 2018-06-28 PROCEDURE — 36000704 HC OR TIME LEV I 1ST 15 MIN: Performed by: INTERNAL MEDICINE

## 2018-06-28 PROCEDURE — 88185 FLOWCYTOMETRY/TC ADD-ON: CPT | Performed by: PATHOLOGY

## 2018-06-28 PROCEDURE — 36415 COLL VENOUS BLD VENIPUNCTURE: CPT

## 2018-06-28 PROCEDURE — 88341 IMHCHEM/IMCYTCHM EA ADD ANTB: CPT | Mod: 26,59,, | Performed by: PATHOLOGY

## 2018-06-28 PROCEDURE — 88313 SPECIAL STAINS GROUP 2: CPT

## 2018-06-28 PROCEDURE — 88299 UNLISTED CYTOGENETIC STUDY: CPT

## 2018-06-28 PROCEDURE — 25000003 PHARM REV CODE 250: Performed by: INTERNAL MEDICINE

## 2018-06-28 PROCEDURE — 85097 BONE MARROW INTERPRETATION: CPT | Mod: ,,, | Performed by: PATHOLOGY

## 2018-06-28 PROCEDURE — 88313 SPECIAL STAINS GROUP 2: CPT | Performed by: PATHOLOGY

## 2018-06-28 PROCEDURE — 88264 CHROMOSOME ANALYSIS 20-25: CPT

## 2018-06-28 PROCEDURE — 81450 HL NEO GSAP 5-50DNA/DNA&RNA: CPT

## 2018-06-28 PROCEDURE — 88342 IMHCHEM/IMCYTCHM 1ST ANTB: CPT | Mod: 26,59,, | Performed by: PATHOLOGY

## 2018-06-28 PROCEDURE — 88305 TISSUE EXAM BY PATHOLOGIST: CPT | Mod: 26,,, | Performed by: PATHOLOGY

## 2018-06-28 PROCEDURE — 71000033 HC RECOVERY, INTIAL HOUR: Performed by: INTERNAL MEDICINE

## 2018-06-28 PROCEDURE — 88311 DECALCIFY TISSUE: CPT | Mod: 26,,, | Performed by: PATHOLOGY

## 2018-06-28 PROCEDURE — 88184 FLOWCYTOMETRY/ TC 1 MARKER: CPT | Performed by: PATHOLOGY

## 2018-06-28 PROCEDURE — D9220A PRA ANESTHESIA: Mod: ANES,,, | Performed by: ANESTHESIOLOGY

## 2018-06-28 RX ORDER — LIDOCAINE HCL/PF 100 MG/5ML
SYRINGE (ML) INTRAVENOUS
Status: DISCONTINUED | OUTPATIENT
Start: 2018-06-28 | End: 2018-06-28

## 2018-06-28 RX ORDER — LIDOCAINE HYDROCHLORIDE 10 MG/ML
1 INJECTION, SOLUTION EPIDURAL; INFILTRATION; INTRACAUDAL; PERINEURAL ONCE
Status: COMPLETED | OUTPATIENT
Start: 2018-06-28 | End: 2018-06-28

## 2018-06-28 RX ORDER — PROPOFOL 10 MG/ML
VIAL (ML) INTRAVENOUS CONTINUOUS PRN
Status: DISCONTINUED | OUTPATIENT
Start: 2018-06-28 | End: 2018-06-28

## 2018-06-28 RX ORDER — LIDOCAINE HYDROCHLORIDE 10 MG/ML
INJECTION, SOLUTION EPIDURAL; INFILTRATION; INTRACAUDAL; PERINEURAL
Status: DISCONTINUED | OUTPATIENT
Start: 2018-06-28 | End: 2018-06-28 | Stop reason: HOSPADM

## 2018-06-28 RX ORDER — SODIUM CHLORIDE 9 MG/ML
INJECTION, SOLUTION INTRAVENOUS CONTINUOUS
Status: DISCONTINUED | OUTPATIENT
Start: 2018-06-28 | End: 2018-06-28 | Stop reason: HOSPADM

## 2018-06-28 RX ORDER — PROPOFOL 10 MG/ML
VIAL (ML) INTRAVENOUS
Status: DISCONTINUED | OUTPATIENT
Start: 2018-06-28 | End: 2018-06-28

## 2018-06-28 RX ORDER — ONDANSETRON 2 MG/ML
4 INJECTION INTRAMUSCULAR; INTRAVENOUS ONCE AS NEEDED
Status: DISCONTINUED | OUTPATIENT
Start: 2018-06-28 | End: 2018-06-28 | Stop reason: HOSPADM

## 2018-06-28 RX ADMIN — LIDOCAINE HYDROCHLORIDE 1 MG: 10 INJECTION, SOLUTION EPIDURAL; INFILTRATION; INTRACAUDAL; PERINEURAL at 07:06

## 2018-06-28 RX ADMIN — PROPOFOL 70 MG: 10 INJECTION, EMULSION INTRAVENOUS at 08:06

## 2018-06-28 RX ADMIN — SODIUM CHLORIDE: 0.9 INJECTION, SOLUTION INTRAVENOUS at 07:06

## 2018-06-28 RX ADMIN — PROPOFOL 200 MCG/KG/MIN: 10 INJECTION, EMULSION INTRAVENOUS at 08:06

## 2018-06-28 RX ADMIN — LIDOCAINE HYDROCHLORIDE 50 MG: 20 INJECTION, SOLUTION INTRAVENOUS at 08:06

## 2018-06-28 NOTE — INTERVAL H&P NOTE
The patient has been examined and the H&P has been reviewed: Ok to proceed with BMBX.    I concur with the findings and no changes have occurred since H&P was written.    Anesthesia/Surgery risks, benefits and alternative options discussed and understood by patient/family.

## 2018-06-28 NOTE — TRANSFER OF CARE
"Anesthesia Transfer of Care Note    Patient: Ivonne Arevalo    Procedure(s) Performed: Procedure(s) (LRB):  Biopsy-bone marrow (Left)    Patient location: PACU    Anesthesia Type: general    Transport from OR: Transported from OR on room air with adequate spontaneous ventilation    Post pain: adequate analgesia    Post assessment: no apparent anesthetic complications and tolerated procedure well    Post vital signs: stable    Level of consciousness: awake, alert and oriented    Nausea/Vomiting: no nausea/vomiting    Complications: none    Transfer of care protocol was followed      Last vitals:   Visit Vitals  BP (!) 143/82 (BP Location: Left arm, Patient Position: Lying)   Pulse 80   Temp 36.4 °C (97.6 °F) (Oral)   Resp 16   Ht 5' 5" (1.651 m)   Wt 73 kg (161 lb)   SpO2 100%   Breastfeeding? No   BMI 26.79 kg/m²     "

## 2018-06-28 NOTE — ANESTHESIA POSTPROCEDURE EVALUATION
"Anesthesia Post Evaluation    Patient: Ivonne Arevalo    Procedure(s) Performed: Procedure(s) (LRB):  Biopsy-bone marrow (Left)    Final Anesthesia Type: general  Patient location during evaluation: PACU  Patient participation: Yes- Able to Participate  Level of consciousness: awake and alert and oriented  Post-procedure vital signs: reviewed and stable  Pain management: adequate  Airway patency: patent  PONV status at discharge: No PONV  Anesthetic complications: no      Cardiovascular status: hemodynamically stable  Respiratory status: unassisted  Hydration status: euvolemic  Follow-up not needed.        Visit Vitals  /77   Pulse 73   Temp 36.3 °C (97.3 °F) (Temporal)   Resp 16   Ht 5' 5" (1.651 m)   Wt 73 kg (161 lb)   SpO2 100%   Breastfeeding? No   BMI 26.79 kg/m²       Pain/Bryan Score: Pain Assessment Performed: Yes (6/28/2018  8:53 AM)  Presence of Pain: denies (6/28/2018  8:53 AM)  Pain Rating Prior to Med Admin: 0 (6/28/2018  7:40 AM)  Pain Rating Post Med Admin: 0 (6/28/2018  7:40 AM)  Bryan Score: 10 (6/28/2018  8:53 AM)      "

## 2018-06-28 NOTE — DISCHARGE SUMMARY
[facility]  Bone Marrow Transplant  Discharge Summary      Patient Name: Ivonne Arevalo  MRN: [id number]  Admission Date: 6/28/2018  Hospital Length of Stay: 0 days  Discharge Date and Time: 6/28/2018  9:18 AM  Attending Physician: No att. providers found   Discharging Provider: Donita Macias NP  Primary Care Provider: Shari Holman MD   Primary Oncologist: Carrington Loco MD      Procedure(s) (LRB):  Biopsy-bone marrow (Left)     Hospital Course: Patient admitted to pre op today for a bone marrow aspiration and biopsy. Pt was consented for a bone marrow biopsy. Patient was sedated per anesthesia and a bone marrow biopsy and aspiration was performed in the OR (see procedure note). Patient was then transferred to post op and discharged home when appropriate per anesthesia.         Pending Diagnostic Studies:     Procedure Component Value Units Date/Time    Bone Marrow Prep and Stain [[id number]] Collected:  06/28/18 0838    Order Status:  Sent Lab Status:  In process Updated:  06/28/18 0856    Specimen:  Bone Marrow from Bone Marrow     Chromosome Analysis, Bone Marrow [[id number]] Collected:  06/28/18 0839    Order Status:  Sent Lab Status:  In process Updated:  06/28/18 0839    Specimen:  Bone Marrow from Bone Marrow     Heme Disorders DNA/RNA Hold, Bone Marrow [[id number]] Collected:  06/28/18 0838    Order Status:  Sent Lab Status:  In process Updated:  06/28/18 0839    Specimen:  Bone Marrow from Bone Marrow     Iron Stain, Bone Marrow [[id number]] Collected:  06/28/18 0838    Order Status:  Sent Lab Status:  In process Updated:  06/28/18 0856    Specimen:  Bone Marrow from Bone Marrow     Leukemia/Lymphoma Screen - Bone Marrow Left Posterior Iliac Crest [[id number]] Collected:  06/28/18 0839    Order Status:  Sent Lab Status:  In process Updated:  06/28/18 0839    Specimen:  Bone Marrow     Tissue Specimen to Pathology, Bone Marrow Aspiration/Biopsy Procedure [[id number]] Collected:  06/28/18 0839     Order Status:  Sent Lab Status:  No result     Specimen:  Bone Marrow from Bone Marrow Core Bx, Left Iliac Crest         Final Active Diagnoses:    Diagnosis Date Noted POA    Thrombocytosis [D47.3] 06/28/2018 Yes      Problems Resolved During this Admission:    Diagnosis Date Noted Date Resolved POA      Discharged Condition: good    Disposition: Home or Self Care    Follow Up: with Dr. Loco in clinic    Patient Instructions:     Notify your health care provider if you experience any of the following:  temperature >100.4     Notify your health care provider if you experience any of the following:  severe uncontrolled pain     Notify your health care provider if you experience any of the following:  redness, tenderness, or signs of infection (pain, swelling, redness, odor or green/yellow discharge around incision site)     Remove dressing in 24 hours     Activity as tolerated       Medications:  Reconciled Home Medications:      Medication List      ASK your doctor about these medications    atorvastatin 20 MG tablet  Commonly known as:  LIPITOR  Take 1 tablet (20 mg total) by mouth once daily.     finasteride 1 mg tablet  Commonly known as:  PROPECIA  Take 5 mg by mouth once daily.     levothyroxine 50 MCG tablet  Commonly known as:  SYNTHROID  Take 1 tablet (50 mcg total) by mouth once daily.     spironolactone 100 MG tablet  Commonly known as:  ALDACTONE  Take 200 mg by mouth once daily.     SUPREP BOWEL PREP KIT 17.5-3.13-1.6 gram Solr  Generic drug:  sodium,potassium,mag sulfates            Donita Macias NP  Bone Marrow Transplant  [facility]

## 2018-06-28 NOTE — ANESTHESIA PREPROCEDURE EVALUATION
2018  Ivonne Arevalo is a 53 y.o., female.  Pre-operative evaluation for Biopsy-bone marrow (Left)    Chief Complaint:thrombocytosis    PMH:GERD  Hyperglyceridemia  Hormonal hair loss  Past Surgical History:   Procedure Laterality Date    CERVICAL DISC ARTHROPLASTY      ENDOMETRIAL ABLATION      hammertoe surgery  4/26/15    right    TOE SURGERY Right     TOTAL SHOULDER ARTHROPLASTY           Vital Signs Range (Last 24H):  BP: ()/()   Arterial Line BP: ()/()       CBC:     Recent Labs  Lab 18  0750   WBC 8.83   RBC 4.39   HGB 14.2   HCT 42.7   *   MCV 97   MCH 32.3*   MCHC 33.3       CMP:   Recent Labs  Lab 18  0750      K 4.5      CO2 25   BUN 14   CREATININE 0.8   GLU 90   CALCIUM 10.2   ALBUMIN 4.2   PROT 7.9   ALKPHOS 77   ALT 12   AST 18   BILITOT 0.6       INR:    Recent Labs  Lab 18  1230   INR 1.0   APTT 31.9         Diagnostic Studies:      EKD Echo:  Anesthesia Evaluation    I have reviewed the Patient Summary Reports.    I have reviewed the Nursing Notes.   I have reviewed the Medications.     Review of Systems  Anesthesia Hx:  No problems with previous Anesthesia    Social:  Non-Smoker, No Alcohol Use    Cardiovascular:  Cardiovascular Normal     Pulmonary:  Pulmonary Normal    Neurological:  Neurology Normal        Physical Exam  General:  Well nourished    Airway/Jaw/Neck:  Airway Findings: Mouth Opening: Normal Tongue: Normal  General Airway Assessment: Good  Mallampati: I  TM Distance: Normal, at least 6 cm  Jaw/Neck Findings:  Neck ROM: Normal ROM      Dental:  Dental Findings: In tact   Chest/Lungs:  Chest/Lungs Findings: Clear to auscultation, Normal Respiratory Rate     Heart/Vascular:  Heart Findings: Rate: Normal  Rhythm: Regular Rhythm  Sounds: Normal        Mental Status:  Mental Status Findings:  Cooperative, Alert and  Oriented         Anesthesia Plan  Type of Anesthesia, risks & benefits discussed:  Anesthesia Type:  general  Patient's Preference:   Intra-op Monitoring Plan:   Intra-op Monitoring Plan Comments:   Post Op Pain Control Plan:   Post Op Pain Control Plan Comments:   Induction:   IV  Beta Blocker:  Patient is not currently on a Beta-Blocker (No further documentation required).       Informed Consent: Patient understands risks and agrees with Anesthesia plan.  Questions answered. Anesthesia consent signed with patient.  ASA Score: 2     Day of Surgery Review of History & Physical:    H&P update referred to the surgeon.         Ready For Surgery From Anesthesia Perspective.

## 2018-06-28 NOTE — DISCHARGE INSTRUCTIONS
Discharge instructions for having a Bone Marrow Aspiration / Biopsy    Keep Bandage in place for 24 hours.  - Do not shower or take a tube bath during this time. (you may sponge bathe).  - Call the nurse or physician for excessive bleeding or pain at biopsy site.  - You may take Tylenol as needed for pain.    You have received medication to sedate you.  - Do not drive a car or operate heavy machinery for the rest of the day.  - You may resume other activities as tolerated.    You can call [phone] for any problems during the hours of 8:00 AM-5:00PM.    For an emergency after 5:00 PM you can call [phone] and have the  page the Hematologist / Oncologist on call.

## 2018-06-28 NOTE — H&P (VIEW-ONLY)
CC:  Epistaxis, bruising, thrombocytosis    HPI:  Ms Arevalo is a 52 yo woman who presents today for further evaluation of easy bruising, epistaxis, and thrombocytosis. She has had easy bruising for many years, but notices it is getting worse more recently. Also has nose bleeds every now and then which usually takes 15-20 minutes to stop. Had cervical spine surgery 10 years ago without complications.  Plt count was 415 in 2011, 437 on 12/10/2013. On 5/30/2018, WBC 8.83, Hb 14.2, Hct 42.7, plt count 462. CMP normal. On 6/4/2018, INR, APTT normal. Plt function test normal.     She is not taking any aspirin or herbal meds. Takes [facility] multivitamins.       Past Medical History:   Diagnosis Date    Basal cell cancer     followed by Dr Burnett    Family history of diabetes mellitus (DM)     Family history of early CAD     Mild hyperlipidemia     Stress incontinence          Family History   Problem Relation Age of Onset    Depression Mother     Hypertension Mother     Heart disease Mother     Stroke Mother     Diabetes Father     Heart disease Father     Hypertension Father     Kidney disease Father     Cataracts Father     Hyperlipidemia Sister     Arthritis Brother     Coronary artery disease Brother 42             Hypertension Brother     Heart disease Maternal Aunt     Hypertension Maternal Aunt     Cancer Maternal Uncle         unknown primary    Heart disease Maternal Uncle     Hypertension Maternal Uncle     Arthritis Maternal Grandmother     Diabetes Maternal Grandmother     Heart disease Maternal Grandmother     Hypertension Maternal Grandmother     Stroke Sister 50    Coronary artery disease Sister 56    Amblyopia Neg Hx     Blindness Neg Hx     Glaucoma Neg Hx     Macular degeneration Neg Hx     Strabismus Neg Hx     Retinal detachment Neg Hx        Past Surgical History:   Procedure Laterality Date    CERVICAL DISC ARTHROPLASTY  2008    ENDOMETRIAL ABLATION      beau  surgery  4/26/15    right    TOE SURGERY Right     TOTAL SHOULDER ARTHROPLASTY  2008       Social History     Social History    Marital status:      Spouse name: N/A    Number of children: N/A    Years of education: 2     Occupational History    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   HENT: Positive for nosebleeds. Negative for mouth sores, tinnitus, trouble swallowing and voice change.    Eyes: Negative for pain, redness and visual disturbance.   Respiratory: Negative for cough, shortness of breath and wheezing.    Cardiovascular: Negative for chest pain, palpitations and leg swelling.   Gastrointestinal: Negative for abdominal pain, blood in stool, constipation, diarrhea, nausea and vomiting.   Endocrine: Negative for polydipsia, polyphagia and polyuria.   Genitourinary: Negative for frequency and hematuria.   Musculoskeletal: Negative for arthralgias, back pain, gait problem, joint swelling, myalgias, neck pain and neck stiffness.   Skin: Negative for color change, pallor, rash and wound.        Large hematoma over right thigh after she fell   Neurological: Negative for dizziness, tremors, seizures, syncope, speech difficulty, weakness, numbness and headaches.   Hematological: Negative for adenopathy. Bruises/bleeds easily.   Psychiatric/Behavioral: Negative for confusion, dysphoric mood and self-injury. The patient is not nervous/anxious.         Objective:  Physical Exam   Constitutional: She is oriented to person, place, and time. She appears well-developed and well-nourished. No distress.   HENT:   Head: Normocephalic and atraumatic.   Mouth/Throat: Oropharynx is clear and moist. No oropharyngeal exudate.   Eyes: Conjunctivae and EOM are normal. Pupils are equal, round, and reactive to light. No scleral icterus.   Neck: Normal range of motion. Neck supple. No JVD present. No thyromegaly present.   Cardiovascular: Normal rate, regular rhythm, normal heart sounds and intact distal pulses.  Exam reveals no gallop and no friction rub.    No murmur heard.  Pulmonary/Chest: Effort normal and breath sounds normal. No respiratory distress. She has no wheezes. She has no rales.   Abdominal: Soft. Bowel sounds are normal. She exhibits no distension and no mass. There is no hepatosplenomegaly. There is no tenderness. No hernia.   Musculoskeletal: Normal range of motion. She exhibits no edema, tenderness or deformity.   Lymphadenopathy:     She has no cervical adenopathy.   Neurological: She is alert and oriented to person, place, and time. No cranial nerve deficit. She exhibits normal muscle tone.   Skin: Skin is warm and dry. No rash noted. She is not diaphoretic.   Psychiatric: She has a normal mood and affect. Her behavior is normal. Judgment and thought content normal.   Vitals reviewed.      Labs:  Plt count was 415 in 2011, 437 on 12/10/2013. On 5/30/2018, WBC 8.83, Hb 14.2, Hct 42.7, plt count 462. CMP normal. On 6/4/2018, INR, APTT normal. Plt function test normal.     Assessment and Plan:  1. Easy bruising    2. Epistaxis    3. Thrombocytosis      1.2  - discussed with patient that plt function test is very sensitive in terms of detecting platelet function abnormalities. Normal plt function test result means that her plt function is normal  - check vWF antigen and activity    3.  - Long discussions regarding her elevated plt counts. It has been  elevated since 2011. Discussed that common causes include iron deficiency, inflammation, or underlying bone marrow disease such as essential thrombocythemia. Discuss that we will check iron studies, ESR, CRP, BCR/ABL, JAK2 , MPL, MELISSA today. Discussed that 10% of patients with ET will have negative JAK2, MPL, MELISSA mutations, and the only way to diagnose it is to do a bone marrow biopsy. If she had ET, the treatment will likely be daily aspirin given her low risk and negative history of blood clots in the past. However, aspirin may worsen her bruising. After discussion, Ms. Arevalo would like to proceed with bone marrow biopsy to al for MPN. She is going out of town next week. Will schedule it the last week of June  - labs today. Return to see me in July to discuss the bone marrow biopsy results.       Distress Screening Results: Psychosocial Distress screening score of Distress Score: 0 noted and reviewed. No intervention indicated.

## 2018-06-28 NOTE — PROCEDURES
PROCEDURE NOTE:  Bone Marrow Biopsy  Date: 06/28/2018   Indication: thrombocytosis  Consent: Informed consent was obtained from patient.  Timeout: Done and documented.  Site: Left posterior illiac crest.  Position: Lateral   Prep: Betadine.  Needle used: 11 gauge Jamshidi needle.  Anesthetic: 1% lidocaine 5 cc.  Biopsy: The biopsy needle was introduced into the marrow cavity and an aspirate was obtained without complications and sent for flow cytometry, DNA/RNA hold, and cytogenetics. Core biopsy x2 obtained without difficulty and sent for routine histologic examination.  Complications: None.  Disposition: The patient was discharged home when appropriate per anesthesia.  Minimal blood loss    Donita Macias, KIKA, FNP  Hematology/Bone Marrow Transplant

## 2018-06-29 LAB
BODY SITE - BONE MARROW: NORMAL
CLINICAL DIAGNOSIS - BONE MARROW: NORMAL
FLOW CYTOMETRY ANTIBODIES ANALYZED - BONE MARROW: NORMAL
FLOW CYTOMETRY COMMENT - BONE MARROW: NORMAL
FLOW CYTOMETRY INTERPRETATION - BONE MARROW: NORMAL

## 2018-07-05 ENCOUNTER — OFFICE VISIT (OUTPATIENT)
Dept: HEMATOLOGY/ONCOLOGY | Facility: CLINIC | Age: 53
End: 2018-07-05
Payer: COMMERCIAL

## 2018-07-05 VITALS
DIASTOLIC BLOOD PRESSURE: 77 MMHG | BODY MASS INDEX: 26.22 KG/M2 | SYSTOLIC BLOOD PRESSURE: 120 MMHG | RESPIRATION RATE: 18 BRPM | WEIGHT: 163.13 LBS | HEIGHT: 66 IN | HEART RATE: 80 BPM | TEMPERATURE: 99 F | OXYGEN SATURATION: 100 %

## 2018-07-05 DIAGNOSIS — D75.839 THROMBOCYTOSIS: Primary | ICD-10-CM

## 2018-07-05 PROCEDURE — 99999 PR PBB SHADOW E&M-EST. PATIENT-LVL III: CPT | Mod: PBBFAC,,, | Performed by: INTERNAL MEDICINE

## 2018-07-05 PROCEDURE — 3008F BODY MASS INDEX DOCD: CPT | Mod: CPTII,S$GLB,, | Performed by: INTERNAL MEDICINE

## 2018-07-05 PROCEDURE — 99213 OFFICE O/P EST LOW 20 MIN: CPT | Mod: S$GLB,,, | Performed by: INTERNAL MEDICINE

## 2018-07-05 NOTE — PROGRESS NOTES
PROGRESS NOTE    Subjective:       Patient ID: Ivonne Arevalo is a 53 y.o. female.    Chief Complaint:  F/u for thrombocytosis    HEMATOLOGIC HISTORY:  1. Ms. Arevalo is a 54 yo woman who saw me on 6/14/18 for easy bruising and thrombocytopenia. She has had easy bruising for many years, but notices it is getting worse more recently. Also has nose bleeds every now and then which usually takes 15-20 minutes to stop. Had cervical spine surgery 10 years ago without complications.  Plt count was 415 in 2011, 437 on 12/10/2013. On 5/30/2018, WBC 8.83, Hb 14.2, Hct 42.7, plt count 462. CMP normal. On 6/4/2018, INR, APTT normal. Plt function test normal.  2. VWF antigen and activity on 6/14/18 normal. Ferritin mildly elevated at 332, iron studies normal. CRP normal, ESR mildly elevated at 27. JAK2, MPL, MELISSA negative.   3. Underwent BMBx on 6/28/18 for thrombocytosis.   CELLULARITY=60-70%, TRILINEAGE HEMATOPOIETIC ACTIVITY (M/E=3.9:1). SEE COMMENT.  ADEQUATE STORAGE IRON.  ADEQUATE NUMBER OF MEGAKARYOCYTES.  COMMENT: Flow cytometry analysis of bone marrow aspirate shows lymph gate(6%) containing T and B cells. No  B cell clonality or T-cell aberrancy is evident. Blast gate is not increased.  Immunohistochemical studies were performed on the core biopsy for further architecture evaluation with adequate  positive and negative controls. Scattered mixed predominantly T cells (CD3 positive) and B cells (CD20 positive)  are evident. About 4-5% plasma cells ( positive) are noted. CD61 highlights adequate number of  megakaryocytes.  See ABEL report:  Peripheral blood, JAK2 V617F mutation analysis: Negative for JAK2 V617F.  Peripheral blood, CALR mutation analysis, exon 9. Negative. No deletion or insertion was detected in CALR, exon  9.  BCR/ABL, FISH (D-FISH), Blood: The result is within normal limits for the BCR and ABL1.  There is no definitive morphologic evidence of  myeloproliferative neoplasm. Correlate with a cytogenetics report  and NGS report to rule out clonal hematopoiesis. A supplemental report will follow.      Cytogenetics and NGS panel pending.     History of Present Illness:   Ms. Arevalo returns today to discuss bone marrow biopsy findings. She feels good, denies any complaints.     ROS:   See HPI. Otherwise neg.     Physical Examination:   Vital signs reviewed.   Gen: well hydrated, well developed, in no acute distress.  HEENT: normocephalic, anicteric, PERRLA, EOMI, oropharynx clear  Neck: supple, no JVD, thyromegaly, cervical or supraclavicular LAD  Lungs: CTAB, no wheezes or rales  Heart: RRR, no M/R/G  Abdomen: soft, no tenderness, non-distended, no hepatosplenomegaly, mass, or hernia.   Ext: no cyanosis, edema, deformity  Neuro: alert and oriented x 4, no focal neuro deficit  Skin: no rash, open wounds or ulcers  Psych: pleasant and appropriate mood and affect    Objective:       Laboratory Data:  plt count was 462 on 5/30/18      Assessment/Plan:     1. Thrombocytosis      - Discussed with Ms. Arevalo that BMBx did not show e/o myeloproliferative neoplasm/essential thrombocythemia. Cytogenetics and NGS are pending. If there are abnormalities, I will give her a call. Otherwise I would just keep monitoring her counts. I do recommend her to stay on aspirin 81 mg daily as long as she can tolerate.   - RTC in 6 months to monitor plt count  - All her questions were answered in the office. Knows to call should questions arise or symptoms develop.     Electronically signed by Carrington Loco

## 2018-07-06 LAB
CHROM BANDING METHOD: NORMAL
CHROMOSOME ANALYSIS BM ADDITIONAL INFORMATION: NORMAL
CHROMOSOME ANALYSIS BM RELEASED BY: NORMAL
CHROMOSOME ANALYSIS BM RESULT SUMMARY: NORMAL
CLINICAL CYTOGENETICIST REVIEW: NORMAL
KARYOTYP MAR: NORMAL
REASON FOR REFERRAL (NARRATIVE): NORMAL
REF LAB TEST METHOD: NORMAL
SPECIMEN SOURCE: NORMAL
SPECIMEN: NORMAL

## 2018-07-09 ENCOUNTER — PATIENT MESSAGE (OUTPATIENT)
Dept: HEMATOLOGY/ONCOLOGY | Facility: CLINIC | Age: 53
End: 2018-07-09

## 2018-07-09 LAB
DNA/RNA EXTRACT AND HOLD RESULT: NORMAL
DNA/RNA EXTRACTION: NORMAL
EXHR SPECIMEN TYPE: NORMAL

## 2018-07-18 LAB
ANNOTATION COMMENT IMP: NORMAL
NGS CLINCIAL TRIALS: NORMAL
NGS INDICATION OF TEST: NORMAL
NGS INTERPRETATION: NORMAL
NGS ONCOHEME PANEL GENE LIST: NORMAL
NGS PATHOGENIC MUTATIONS DETECTED: NORMAL
NGS REVIEWED BY:: NORMAL
NGS VARIANTS OF UNKNOWN SIGNIFICANCE: NORMAL
REF LAB TEST METHOD: NORMAL
SPECIMEN SOURCE: NORMAL
TEST PERFORMANCE INFO SPEC: NORMAL

## 2018-08-06 ENCOUNTER — PATIENT MESSAGE (OUTPATIENT)
Dept: INTERNAL MEDICINE | Facility: CLINIC | Age: 53
End: 2018-08-06

## 2018-09-27 RX ORDER — ATORVASTATIN CALCIUM 20 MG/1
20 TABLET, FILM COATED ORAL DAILY
Qty: 30 TABLET | Refills: 0 | OUTPATIENT
Start: 2018-09-27

## 2018-09-28 ENCOUNTER — PATIENT MESSAGE (OUTPATIENT)
Dept: INTERNAL MEDICINE | Facility: CLINIC | Age: 53
End: 2018-09-28

## 2018-09-28 RX ORDER — ATORVASTATIN CALCIUM 20 MG/1
20 TABLET, FILM COATED ORAL DAILY
Qty: 90 TABLET | Refills: 3 | Status: SHIPPED | OUTPATIENT
Start: 2018-09-28 | End: 2019-07-15 | Stop reason: ALTCHOICE

## 2018-09-28 RX ORDER — LEVOTHYROXINE SODIUM 50 UG/1
50 TABLET ORAL DAILY
Qty: 90 TABLET | Refills: 3 | Status: SHIPPED | OUTPATIENT
Start: 2018-09-28 | End: 2019-12-02 | Stop reason: SDUPTHER

## 2018-10-29 ENCOUNTER — PATIENT MESSAGE (OUTPATIENT)
Dept: HEMATOLOGY/ONCOLOGY | Facility: CLINIC | Age: 53
End: 2018-10-29

## 2018-11-01 ENCOUNTER — PATIENT MESSAGE (OUTPATIENT)
Dept: HEMATOLOGY/ONCOLOGY | Facility: CLINIC | Age: 53
End: 2018-11-01

## 2018-12-27 ENCOUNTER — LAB VISIT (OUTPATIENT)
Dept: LAB | Facility: HOSPITAL | Age: 53
End: 2018-12-27
Attending: INTERNAL MEDICINE
Payer: COMMERCIAL

## 2018-12-27 DIAGNOSIS — D75.839 THROMBOCYTOSIS: ICD-10-CM

## 2018-12-27 LAB
BASOPHILS # BLD AUTO: 0.06 K/UL
BASOPHILS NFR BLD: 0.7 %
DIFFERENTIAL METHOD: ABNORMAL
EOSINOPHIL # BLD AUTO: 0.1 K/UL
EOSINOPHIL NFR BLD: 0.7 %
ERYTHROCYTE [DISTWIDTH] IN BLOOD BY AUTOMATED COUNT: 11.2 %
HCT VFR BLD AUTO: 40.8 %
HGB BLD-MCNC: 13.5 G/DL
IMM GRANULOCYTES # BLD AUTO: 0.02 K/UL
IMM GRANULOCYTES NFR BLD AUTO: 0.2 %
LYMPHOCYTES # BLD AUTO: 2.4 K/UL
LYMPHOCYTES NFR BLD: 29 %
MCH RBC QN AUTO: 32.7 PG
MCHC RBC AUTO-ENTMCNC: 33.1 G/DL
MCV RBC AUTO: 99 FL
MONOCYTES # BLD AUTO: 0.7 K/UL
MONOCYTES NFR BLD: 8 %
NEUTROPHILS # BLD AUTO: 5 K/UL
NEUTROPHILS NFR BLD: 61.4 %
NRBC BLD-RTO: 0 /100 WBC
PLATELET # BLD AUTO: 443 K/UL
PMV BLD AUTO: 9.9 FL
RBC # BLD AUTO: 4.13 M/UL
WBC # BLD AUTO: 8.16 K/UL

## 2018-12-27 PROCEDURE — 85025 COMPLETE CBC W/AUTO DIFF WBC: CPT

## 2018-12-27 PROCEDURE — 36415 COLL VENOUS BLD VENIPUNCTURE: CPT | Mod: PO

## 2018-12-28 ENCOUNTER — OFFICE VISIT (OUTPATIENT)
Dept: HEMATOLOGY/ONCOLOGY | Facility: CLINIC | Age: 53
End: 2018-12-28
Payer: COMMERCIAL

## 2018-12-28 VITALS
DIASTOLIC BLOOD PRESSURE: 74 MMHG | OXYGEN SATURATION: 98 % | HEIGHT: 66 IN | TEMPERATURE: 99 F | WEIGHT: 162.5 LBS | RESPIRATION RATE: 16 BRPM | BODY MASS INDEX: 26.12 KG/M2 | SYSTOLIC BLOOD PRESSURE: 130 MMHG | HEART RATE: 88 BPM

## 2018-12-28 DIAGNOSIS — D75.839 THROMBOCYTOSIS: Primary | ICD-10-CM

## 2018-12-28 PROCEDURE — 99999 PR PBB SHADOW E&M-EST. PATIENT-LVL III: CPT | Mod: PBBFAC,,, | Performed by: INTERNAL MEDICINE

## 2018-12-28 PROCEDURE — 3008F BODY MASS INDEX DOCD: CPT | Mod: CPTII,S$GLB,, | Performed by: INTERNAL MEDICINE

## 2018-12-28 PROCEDURE — 99213 OFFICE O/P EST LOW 20 MIN: CPT | Mod: S$GLB,,, | Performed by: INTERNAL MEDICINE

## 2018-12-28 RX ORDER — FLUTICASONE PROPIONATE 50 MCG
SPRAY, SUSPENSION (ML) NASAL
Refills: 1 | COMMUNITY
Start: 2018-12-12 | End: 2021-01-29

## 2018-12-28 RX ORDER — HYDROCODONE BITARTRATE AND ACETAMINOPHEN 7.5; 325 MG/1; MG/1
1 TABLET ORAL
Refills: 0 | COMMUNITY
Start: 2018-12-05 | End: 2022-10-25

## 2018-12-28 NOTE — PROGRESS NOTES
Subjective:       Patient ID: Ivonne Arevalo is a 53 y.o. female.     Chief Complaint:  F/u for thrombocytosis     Hematologic History:  1. Ms. Arevalo is a 54 yo woman who saw me on 6/14/18 for easy bruising and thrombocytopenia. She has had easy bruising for many years, but notices it is getting worse more recently. Also has nose bleeds every now and then which usually takes 15-20 minutes to stop. Had cervical spine surgery 10 years ago without complications.  Plt count was 415 in 2011, 437 on 12/10/2013. On 5/30/2018, WBC 8.83, Hb 14.2, Hct 42.7, plt count 462. CMP normal. On 6/4/2018, INR, APTT normal. Plt function test normal.  2. VWF antigen and activity on 6/14/18 normal. Ferritin mildly elevated at 332, iron studies normal. CRP normal, ESR mildly elevated at 27. JAK2, MPL, MELISSA negative. BCR/ABL negative.   3. Underwent BMBx on 6/28/18 for thrombocytosis.   CELLULARITY=60-70%, TRILINEAGE HEMATOPOIETIC ACTIVITY (M/E=3.9:1). SEE COMMENT.  ADEQUATE STORAGE IRON.  ADEQUATE NUMBER OF MEGAKARYOCYTES.  COMMENT: Flow cytometry analysis of bone marrow aspirate shows lymph gate(6%) containing T and B cells. No  B cell clonality or T-cell aberrancy is evident. Blast gate is not increased.  Immunohistochemical studies were performed on the core biopsy for further architecture evaluation with adequate  positive and negative controls. Scattered mixed predominantly T cells (CD3 positive) and B cells (CD20 positive)  are evident. About 4-5% plasma cells ( positive) are noted. CD61 highlights adequate number of  megakaryocytes.  See ABEL report:  Peripheral blood, JAK2 V617F mutation analysis: Negative for JAK2 V617F.  Peripheral blood, CALR mutation analysis, exon 9. Negative. No deletion or insertion was detected in CALR, exon  9.  BCR/ABL, FISH (D-FISH), Blood: The result is within normal limits for the BCR and ABL1.  There is no definitive morphologic evidence of myeloproliferative neoplasm. Correlate with a cytogenetics  report  and NGS report to rule out clonal hematopoiesis. A supplemental report will follow.    Bone marrow karyotype results: 46, XX[20], female karyotype.    OncoHeme (NGS) Hematologic Neoplasms, BM: No pathogenic genetic alteration is detected in the listed gene  regions. This finding does not exclude the presence of genetic alteration occurring at allele frequency below our  established detection limit of 5-10%, or other genetic alterations present in untested gene regions.     Interval History:   Ms. Arevalo returns today for follow up. Feeling well. Had arthroscopy of the right shoulder in early December for her bone spur and inflammation. No complaints. Taking baby aspirin daily.      ROS:   See HPI. Otherwise neg.      Physical Examination:   Vital signs reviewed.   Gen: well hydrated, well developed, in no acute distress.  HEENT: normocephalic, anicteric, PERRLA, EOMI, oropharynx clear  Neck: supple, no JVD, thyromegaly, cervical or supraclavicular LAD  Lungs: CTAB, no wheezes or rales  Heart: RRR, no M/R/G  Abdomen: soft, no tenderness, non-distended, no hepatosplenomegaly, mass, or hernia.   Ext: no cyanosis, edema, deformity  Neuro: alert and oriented x 4, no focal neuro deficit  Skin: no rash, open wounds or ulcers  Psych: pleasant and appropriate mood and affect     Objective:      Laboratory Data:  plt count on 12/27/18 was 443.         Assessment/Plan:      1. Thrombocytosis      - Ms Arevalo is a 54 yo woman with chronic thrombocytosis with plt count in the 400's range. Extensive workup including a bone marrow biopsy have been unremarkable. Thrombocytosis likely reactive  - c/w baby aspirin daily  - RTC in one year for follow up  - Knows to call should questions arise or symptoms develop.     Distress Screening Results: Psychosocial Distress screening score of Distress Score: 0 noted and reviewed. No intervention indicated.

## 2019-05-16 ENCOUNTER — PATIENT MESSAGE (OUTPATIENT)
Dept: INTERNAL MEDICINE | Facility: CLINIC | Age: 54
End: 2019-05-16

## 2019-05-16 DIAGNOSIS — Z00.00 ANNUAL PHYSICAL EXAM: Primary | ICD-10-CM

## 2019-07-08 ENCOUNTER — LAB VISIT (OUTPATIENT)
Dept: LAB | Facility: HOSPITAL | Age: 54
End: 2019-07-08
Attending: INTERNAL MEDICINE
Payer: COMMERCIAL

## 2019-07-08 DIAGNOSIS — Z00.00 ANNUAL PHYSICAL EXAM: ICD-10-CM

## 2019-07-08 LAB
ALBUMIN SERPL BCP-MCNC: 4.4 G/DL (ref 3.5–5.2)
ALP SERPL-CCNC: 89 U/L (ref 55–135)
ALT SERPL W/O P-5'-P-CCNC: 12 U/L (ref 10–44)
ANION GAP SERPL CALC-SCNC: 12 MMOL/L (ref 8–16)
AST SERPL-CCNC: 22 U/L (ref 10–40)
BASOPHILS # BLD AUTO: 0.06 K/UL (ref 0–0.2)
BASOPHILS NFR BLD: 0.7 % (ref 0–1.9)
BILIRUB SERPL-MCNC: 0.5 MG/DL (ref 0.1–1)
BUN SERPL-MCNC: 16 MG/DL (ref 6–20)
CALCIUM SERPL-MCNC: 10.4 MG/DL (ref 8.7–10.5)
CHLORIDE SERPL-SCNC: 104 MMOL/L (ref 95–110)
CHOLEST SERPL-MCNC: 193 MG/DL (ref 120–199)
CHOLEST/HDLC SERPL: 3.5 {RATIO} (ref 2–5)
CO2 SERPL-SCNC: 24 MMOL/L (ref 23–29)
CREAT SERPL-MCNC: 0.9 MG/DL (ref 0.5–1.4)
DIFFERENTIAL METHOD: ABNORMAL
EOSINOPHIL # BLD AUTO: 0.1 K/UL (ref 0–0.5)
EOSINOPHIL NFR BLD: 0.9 % (ref 0–8)
ERYTHROCYTE [DISTWIDTH] IN BLOOD BY AUTOMATED COUNT: 11.5 % (ref 11.5–14.5)
EST. GFR  (AFRICAN AMERICAN): >60 ML/MIN/1.73 M^2
EST. GFR  (NON AFRICAN AMERICAN): >60 ML/MIN/1.73 M^2
ESTIMATED AVG GLUCOSE: 105 MG/DL (ref 68–131)
GLUCOSE SERPL-MCNC: 94 MG/DL (ref 70–110)
HBA1C MFR BLD HPLC: 5.3 % (ref 4–5.6)
HCT VFR BLD AUTO: 44.3 % (ref 37–48.5)
HDLC SERPL-MCNC: 55 MG/DL (ref 40–75)
HDLC SERPL: 28.5 % (ref 20–50)
HGB BLD-MCNC: 14.9 G/DL (ref 12–16)
IMM GRANULOCYTES # BLD AUTO: 0.03 K/UL (ref 0–0.04)
IMM GRANULOCYTES NFR BLD AUTO: 0.3 % (ref 0–0.5)
IRON SERPL-MCNC: 143 UG/DL (ref 30–160)
LDLC SERPL CALC-MCNC: 92.4 MG/DL (ref 63–159)
LYMPHOCYTES # BLD AUTO: 2 K/UL (ref 1–4.8)
LYMPHOCYTES NFR BLD: 21.6 % (ref 18–48)
MCH RBC QN AUTO: 32.7 PG (ref 27–31)
MCHC RBC AUTO-ENTMCNC: 33.6 G/DL (ref 32–36)
MCV RBC AUTO: 97 FL (ref 82–98)
MONOCYTES # BLD AUTO: 0.7 K/UL (ref 0.3–1)
MONOCYTES NFR BLD: 7.6 % (ref 4–15)
NEUTROPHILS # BLD AUTO: 6.2 K/UL (ref 1.8–7.7)
NEUTROPHILS NFR BLD: 68.9 % (ref 38–73)
NONHDLC SERPL-MCNC: 138 MG/DL
NRBC BLD-RTO: 0 /100 WBC
PLATELET # BLD AUTO: 441 K/UL (ref 150–350)
PMV BLD AUTO: 9.7 FL (ref 9.2–12.9)
POTASSIUM SERPL-SCNC: 4.5 MMOL/L (ref 3.5–5.1)
PROT SERPL-MCNC: 8.1 G/DL (ref 6–8.4)
RBC # BLD AUTO: 4.56 M/UL (ref 4–5.4)
SATURATED IRON: 38 % (ref 20–50)
SODIUM SERPL-SCNC: 140 MMOL/L (ref 136–145)
TOTAL IRON BINDING CAPACITY: 377 UG/DL (ref 250–450)
TRANSFERRIN SERPL-MCNC: 255 MG/DL (ref 200–375)
TRIGL SERPL-MCNC: 228 MG/DL (ref 30–150)
TSH SERPL DL<=0.005 MIU/L-ACNC: 2.23 UIU/ML (ref 0.4–4)
WBC # BLD AUTO: 9.02 K/UL (ref 3.9–12.7)

## 2019-07-08 PROCEDURE — 85025 COMPLETE CBC W/AUTO DIFF WBC: CPT

## 2019-07-08 PROCEDURE — 83540 ASSAY OF IRON: CPT

## 2019-07-08 PROCEDURE — 80053 COMPREHEN METABOLIC PANEL: CPT

## 2019-07-08 PROCEDURE — 84443 ASSAY THYROID STIM HORMONE: CPT

## 2019-07-08 PROCEDURE — 36415 COLL VENOUS BLD VENIPUNCTURE: CPT | Mod: PO

## 2019-07-08 PROCEDURE — 83036 HEMOGLOBIN GLYCOSYLATED A1C: CPT

## 2019-07-08 PROCEDURE — 80061 LIPID PANEL: CPT

## 2019-07-15 ENCOUNTER — OFFICE VISIT (OUTPATIENT)
Dept: INTERNAL MEDICINE | Facility: CLINIC | Age: 54
End: 2019-07-15
Attending: INTERNAL MEDICINE
Payer: COMMERCIAL

## 2019-07-15 VITALS
HEART RATE: 82 BPM | SYSTOLIC BLOOD PRESSURE: 122 MMHG | HEIGHT: 65 IN | OXYGEN SATURATION: 98 % | BODY MASS INDEX: 27.03 KG/M2 | WEIGHT: 162.25 LBS | DIASTOLIC BLOOD PRESSURE: 78 MMHG

## 2019-07-15 DIAGNOSIS — E78.2 MIXED HYPERLIPIDEMIA: ICD-10-CM

## 2019-07-15 DIAGNOSIS — Z00.00 ANNUAL PHYSICAL EXAM: Primary | ICD-10-CM

## 2019-07-15 DIAGNOSIS — D75.839 THROMBOCYTOSIS: ICD-10-CM

## 2019-07-15 PROCEDURE — 99999 PR PBB SHADOW E&M-EST. PATIENT-LVL III: ICD-10-PCS | Mod: PBBFAC,,, | Performed by: INTERNAL MEDICINE

## 2019-07-15 PROCEDURE — 99396 PREV VISIT EST AGE 40-64: CPT | Mod: S$GLB,,, | Performed by: INTERNAL MEDICINE

## 2019-07-15 PROCEDURE — 99396 PR PREVENTIVE VISIT,EST,40-64: ICD-10-PCS | Mod: S$GLB,,, | Performed by: INTERNAL MEDICINE

## 2019-07-15 PROCEDURE — 99999 PR PBB SHADOW E&M-EST. PATIENT-LVL III: CPT | Mod: PBBFAC,,, | Performed by: INTERNAL MEDICINE

## 2019-07-15 RX ORDER — FENOFIBRATE 200 MG/1
200 CAPSULE ORAL
Qty: 90 CAPSULE | Refills: 3 | Status: SHIPPED | OUTPATIENT
Start: 2019-07-15 | End: 2020-08-03

## 2019-07-15 NOTE — PROGRESS NOTES
Subjective:       Patient ID: Ivonne Arevalo is a 54 y.o. female.    Chief Complaint: Annual Exam     Ivonne Arevalo is a 54 y.o.  female who presents for Annual Exam  .  Patient Active Problem List   Diagnosis    Mild hyperlipidemia    Stress incontinence    Basal cell cancer    Family history of early CAD    Family history of diabetes mellitus (DM)    Hypertriglyceridemia    Hypothyroid    Herpes zoster of eyelid    Gastroesophageal reflux disease with esophagitis    Atypical chest pain    Thrombocytosis      Ivonne Arevalo is a 54 y.o.  female who presents for Annual Exam  Has not been exercising regularly but has been much more active.    History of hypertriglyceridemia. Admits to eating sweets frequently. Has a few drinks on weekends.        Health Maintenance       Date Due Completion Date    TETANUS VACCINE 01/31/1983 ---    Pneumococcal Vaccine (Highest Risk) (1 of 3 - PCV13) 01/31/1984 ---    Shingles Vaccine (1 of 2) 01/31/2015 ---    Mammogram 12/18/2018 12/18/2017    Override on 12/18/2017: Done (done at [facility])    Override on 12/14/2016: Done (at [facility])    Override on 11/11/2015: Done    Override on 10/13/2014: Done    Override on 11/14/2013: Done    Pap Smear with HPV Cotest 04/27/2019 4/27/2016 (Done)    Override on 4/27/2016: Done    Override on 11/14/2013: Done    Influenza Vaccine 08/01/2019 4/21/2016 (Declined)    Override on 4/21/2016: Declined    Override on 2/24/2015: Declined    Lipid Panel 07/08/2024 7/8/2019    Colonoscopy 05/03/2026 5/3/2016    Override on 4/21/2016: Declined (scheduled may 3rd with dr salgado)          Review of Systems   Constitutional: Positive for fatigue. Negative for chills and fever.   HENT: Negative for rhinorrhea and sore throat.    Respiratory: Negative for cough and shortness of breath.    Cardiovascular: Negative for chest pain and palpitations.   Gastrointestinal: Negative for nausea and vomiting.   Genitourinary: Negative for dysuria and  hematuria.   Musculoskeletal: Negative for arthralgias and back pain.   Skin: Negative for color change and rash.   Neurological: Negative for weakness and numbness.   Psychiatric/Behavioral: Negative for agitation and dysphoric mood.         Past Medical History:   Diagnosis Date    Basal cell cancer     followed by Dr Burnett    Family history of diabetes mellitus (DM)     Family history of early CAD     Mild hyperlipidemia     Stress incontinence        Past Surgical History:   Procedure Laterality Date    Biopsy-bone marrow Left 6/28/2018    Performed by Saul Freitas MD at [facility] OR [address] [place] [address]    CERVICAL DISC ARTHROPLASTY  2008    ENDOMETRIAL ABLATION      hammertoe surgery  4/26/15    right    TOE SURGERY Right     TOTAL SHOULDER ARTHROPLASTY  2008       Family History   Problem Relation Age of Onset    Depression Mother     Hypertension Mother     Heart disease Mother     Stroke Mother     Alzheimer's disease Mother     Diabetes Father     Heart disease Father     Hypertension Father     Kidney disease Father     Cataracts Father     Cirrhosis Father     Hyperlipidemia Sister     Arthritis Brother     Coronary artery disease Brother 42             Hypertension Brother     Heart attack Brother     Heart disease Maternal Aunt     Hypertension Maternal Aunt     Cancer Maternal Uncle         unknown primary    Heart disease Maternal Uncle     Hypertension Maternal Uncle     Arthritis Maternal Grandmother     Diabetes Maternal Grandmother     Heart disease Maternal Grandmother     Hypertension Maternal Grandmother     Stroke Sister 50    Coronary artery disease Sister 56    Amblyopia Neg Hx     Blindness Neg Hx     Glaucoma Neg Hx     Macular degeneration Neg Hx     Strabismus Neg Hx     Retinal detachment Neg Hx        Social History     Tobacco Use    Smoking status: Never Smoker    Smokeless tobacco: Never Used   Substance Use Topics    Alcohol use: Yes      "Alcohol/week: 3.0 oz     Types: 2 Glasses of wine, 2 Cans of beer, 1 Shots of liquor per week    Drug use: No             Objective:   Blood pressure 122/78, pulse 82, height 5' 5" (1.651 m), weight 73.6 kg (162 lb 4.1 oz), SpO2 98 %.     Physical Exam   Constitutional: She is oriented to person, place, and time. She appears well-developed and well-nourished. No distress.   HENT:   Head: Normocephalic and atraumatic.   Right Ear: External ear normal.   Left Ear: External ear normal.   Eyes: Conjunctivae are normal. No scleral icterus.   Neck: No JVD present. Carotid bruit is not present. No thyromegaly present.   Cardiovascular: Normal rate and normal heart sounds. Exam reveals no gallop and no friction rub.   No murmur heard.  Pulmonary/Chest: Effort normal and breath sounds normal. She has no wheezes. She has no rales.   Abdominal: Soft. Bowel sounds are normal. She exhibits no distension. There is no tenderness.   Musculoskeletal: She exhibits no edema or tenderness.   Lymphadenopathy:     She has no cervical adenopathy.        Right: No supraclavicular adenopathy present.        Left: No supraclavicular adenopathy present.   Neurological: She is alert and oriented to person, place, and time. She has normal reflexes.   Skin: Skin is warm and dry.   Psychiatric: She has a normal mood and affect. Her behavior is normal. Thought content normal.       Prior labs reviewed  Assessment/Plan:        Ivonne was seen today for annual exam.    Diagnoses and all orders for this visit:    Annual physical exam  Recommend daily sunscreen, cardiovascular exercise min 30 min 5 days per week. Seatbelts routinely.  Recommend monthly self breast exams and annual mammogram.  Also screening  pap with reflex HPV q 3 years or as recommended by gyn provider.    Thrombocytosis  Stable, has followup wit Dr Loco in 6 mo    Mixed hyperlipidemia  Comments:  stop lipitor  start fenofibrate. add exercise avoid sweets  repeat labs in 6 " mo  Orders:  -     Lipid panel; Future  -     Comprehensive metabolic panel; Future    Other orders  -     (In Office Administered) Tdap Vaccine  -     fenofibrate micronized (LOFIBRA) 200 MG Cap; Take 1 capsule (200 mg total) by mouth daily with breakfast.        Medication List with Changes/Refills   New Medications    FENOFIBRATE MICRONIZED (LOFIBRA) 200 MG CAP    Take 1 capsule (200 mg total) by mouth daily with breakfast.   Current Medications    FINASTERIDE (PROPECIA) 1 MG TABLET    Take 5 mg by mouth once daily.     FLUTICASONE (FLONASE) 50 MCG/ACTUATION NASAL SPRAY    TAKE 2 SPRAY(S) (INTRANASAL) 1 TIME PER DAY IN EACH NOSTRIL    HYDROCODONE-ACETAMINOPHEN (NORCO) 7.5-325 MG PER TABLET    Take 1 tablet by mouth every 4 to 6 hours as needed.    LEVOTHYROXINE (SYNTHROID) 50 MCG TABLET    Take 1 tablet (50 mcg total) by mouth once daily.    SPIRONOLACTONE (ALDACTONE) 100 MG TABLET    Take 200 mg by mouth once daily.   Discontinued Medications    ATORVASTATIN (LIPITOR) 20 MG TABLET    Take 1 tablet (20 mg total) by mouth once daily.

## 2019-07-15 NOTE — PATIENT INSTRUCTIONS
Please check out the documentary [place] over [employer] as well as the website for information about a plant based diet.

## 2019-07-18 ENCOUNTER — IMMUNIZATION (OUTPATIENT)
Dept: PHARMACY | Facility: CLINIC | Age: 54
End: 2019-07-18
Payer: COMMERCIAL

## 2019-12-02 ENCOUNTER — PATIENT MESSAGE (OUTPATIENT)
Dept: INTERNAL MEDICINE | Facility: CLINIC | Age: 54
End: 2019-12-02

## 2019-12-02 RX ORDER — LEVOTHYROXINE SODIUM 50 UG/1
50 TABLET ORAL DAILY
Qty: 90 TABLET | Refills: 3 | Status: SHIPPED | OUTPATIENT
Start: 2019-12-02 | End: 2020-09-21 | Stop reason: SDUPTHER

## 2019-12-04 ENCOUNTER — PATIENT MESSAGE (OUTPATIENT)
Dept: INTERNAL MEDICINE | Facility: CLINIC | Age: 54
End: 2019-12-04

## 2019-12-30 ENCOUNTER — TELEPHONE (OUTPATIENT)
Dept: HEMATOLOGY/ONCOLOGY | Facility: CLINIC | Age: 54
End: 2019-12-30

## 2019-12-30 DIAGNOSIS — D75.839 THROMBOCYTOSIS: Primary | ICD-10-CM

## 2019-12-30 NOTE — TELEPHONE ENCOUNTER
----- Message from Carrington Loco MD sent at 12/30/2019 12:33 PM CST -----  Contact: [phone]  CBC ordered. thanks  ----- Message -----  From: Harlan Calvo MA  Sent: 12/30/2019  12:07 PM CST  To: Carrington Loco MD     Please enter lab orders. Tks  ----- Message -----  From: Lay Pierre  Sent: 12/30/2019  11:57 AM CST  To: Clauyd Shultz Staff    Patient has an upcoming lab appointment at the [facility] on 01/03  Please enter lab orders and link them to this appointment.    Thanks,  [facility] 5th floor reception desk

## 2020-01-07 ENCOUNTER — PATIENT MESSAGE (OUTPATIENT)
Dept: HEMATOLOGY/ONCOLOGY | Facility: CLINIC | Age: 55
End: 2020-01-07

## 2020-01-13 ENCOUNTER — LAB VISIT (OUTPATIENT)
Dept: LAB | Facility: HOSPITAL | Age: 55
End: 2020-01-13
Attending: INTERNAL MEDICINE
Payer: COMMERCIAL

## 2020-01-13 DIAGNOSIS — D75.839 THROMBOCYTOSIS: ICD-10-CM

## 2020-01-13 LAB
BASOPHILS # BLD AUTO: 0.06 K/UL (ref 0–0.2)
BASOPHILS NFR BLD: 0.6 % (ref 0–1.9)
DIFFERENTIAL METHOD: ABNORMAL
EOSINOPHIL # BLD AUTO: 0.1 K/UL (ref 0–0.5)
EOSINOPHIL NFR BLD: 0.9 % (ref 0–8)
ERYTHROCYTE [DISTWIDTH] IN BLOOD BY AUTOMATED COUNT: 11.4 % (ref 11.5–14.5)
HCT VFR BLD AUTO: 44.1 % (ref 37–48.5)
HGB BLD-MCNC: 14.5 G/DL (ref 12–16)
IMM GRANULOCYTES # BLD AUTO: 0.03 K/UL (ref 0–0.04)
IMM GRANULOCYTES NFR BLD AUTO: 0.3 % (ref 0–0.5)
LYMPHOCYTES # BLD AUTO: 2.2 K/UL (ref 1–4.8)
LYMPHOCYTES NFR BLD: 23.1 % (ref 18–48)
MCH RBC QN AUTO: 32.5 PG (ref 27–31)
MCHC RBC AUTO-ENTMCNC: 32.9 G/DL (ref 32–36)
MCV RBC AUTO: 99 FL (ref 82–98)
MONOCYTES # BLD AUTO: 0.7 K/UL (ref 0.3–1)
MONOCYTES NFR BLD: 7.8 % (ref 4–15)
NEUTROPHILS # BLD AUTO: 6.3 K/UL (ref 1.8–7.7)
NEUTROPHILS NFR BLD: 67.3 % (ref 38–73)
NRBC BLD-RTO: 0 /100 WBC
PLATELET # BLD AUTO: 565 K/UL (ref 150–350)
PMV BLD AUTO: 9.9 FL (ref 9.2–12.9)
RBC # BLD AUTO: 4.46 M/UL (ref 4–5.4)
WBC # BLD AUTO: 9.37 K/UL (ref 3.9–12.7)

## 2020-01-13 PROCEDURE — 85025 COMPLETE CBC W/AUTO DIFF WBC: CPT

## 2020-01-13 PROCEDURE — 36415 COLL VENOUS BLD VENIPUNCTURE: CPT | Mod: PO

## 2020-01-14 ENCOUNTER — OFFICE VISIT (OUTPATIENT)
Dept: HEMATOLOGY/ONCOLOGY | Facility: CLINIC | Age: 55
End: 2020-01-14
Payer: COMMERCIAL

## 2020-01-14 VITALS
RESPIRATION RATE: 16 BRPM | TEMPERATURE: 99 F | WEIGHT: 165.38 LBS | DIASTOLIC BLOOD PRESSURE: 72 MMHG | SYSTOLIC BLOOD PRESSURE: 118 MMHG | OXYGEN SATURATION: 98 % | HEIGHT: 66 IN | BODY MASS INDEX: 26.58 KG/M2 | HEART RATE: 82 BPM

## 2020-01-14 DIAGNOSIS — D75.839 THROMBOCYTOSIS: Primary | ICD-10-CM

## 2020-01-14 DIAGNOSIS — R23.3 EASY BRUISING: ICD-10-CM

## 2020-01-14 PROCEDURE — 99214 PR OFFICE/OUTPT VISIT, EST, LEVL IV, 30-39 MIN: ICD-10-PCS | Mod: S$GLB,,, | Performed by: INTERNAL MEDICINE

## 2020-01-14 PROCEDURE — 3008F PR BODY MASS INDEX (BMI) DOCUMENTED: ICD-10-PCS | Mod: CPTII,S$GLB,, | Performed by: INTERNAL MEDICINE

## 2020-01-14 PROCEDURE — 3008F BODY MASS INDEX DOCD: CPT | Mod: CPTII,S$GLB,, | Performed by: INTERNAL MEDICINE

## 2020-01-14 PROCEDURE — 99999 PR PBB SHADOW E&M-EST. PATIENT-LVL III: CPT | Mod: PBBFAC,,, | Performed by: INTERNAL MEDICINE

## 2020-01-14 PROCEDURE — 99999 PR PBB SHADOW E&M-EST. PATIENT-LVL III: ICD-10-PCS | Mod: PBBFAC,,, | Performed by: INTERNAL MEDICINE

## 2020-01-14 PROCEDURE — 99214 OFFICE O/P EST MOD 30 MIN: CPT | Mod: S$GLB,,, | Performed by: INTERNAL MEDICINE

## 2020-01-14 NOTE — PROGRESS NOTES
Subjective:       Patient ID: Ivonne Arevalo is a 54 y.o. female.     Chief Complaint:  F/u for thrombocytosis     Hematologic History:  1. Ms. Arevalo is a 54 yo woman who saw me on 6/14/18 for easy bruising and thrombocytopenia. She has had easy bruising for many years, but notices it is getting worse more recently. Also has nose bleeds every now and then which usually takes 15-20 minutes to stop. Had cervical spine surgery 10 years ago without complications.  Plt count was 415 in 2011, 437 on 12/10/2013. On 5/30/2018, WBC 8.83, Hb 14.2, Hct 42.7, plt count 462. CMP normal. On 6/4/2018, INR, APTT normal. Plt function test normal.  2. VWF antigen and activity on 6/14/18 normal. Ferritin mildly elevated at 332, iron studies normal. CRP normal, ESR mildly elevated at 27. JAK2, MPL, MELISSA negative. BCR/ABL negative.   3. Underwent BMBx on 6/28/18 for thrombocytosis.   CELLULARITY=60-70%, TRILINEAGE HEMATOPOIETIC ACTIVITY (M/E=3.9:1). SEE COMMENT.  ADEQUATE STORAGE IRON.  ADEQUATE NUMBER OF MEGAKARYOCYTES.  COMMENT: Flow cytometry analysis of bone marrow aspirate shows lymph gate(6%) containing T and B cells. No  B cell clonality or T-cell aberrancy is evident. Blast gate is not increased.  Immunohistochemical studies were performed on the core biopsy for further architecture evaluation with adequate  positive and negative controls. Scattered mixed predominantly T cells (CD3 positive) and B cells (CD20 positive)  are evident. About 4-5% plasma cells ( positive) are noted. CD61 highlights adequate number of  megakaryocytes.  See ABEL report:  Peripheral blood, JAK2 V617F mutation analysis: Negative for JAK2 V617F.  Peripheral blood, CALR mutation analysis, exon 9. Negative. No deletion or insertion was detected in CALR, exon  9.  BCR/ABL, FISH (D-FISH), Blood: The result is within normal limits for the BCR and ABL1.  There is no definitive morphologic evidence of myeloproliferative neoplasm. Correlate with a cytogenetics  report  and NGS report to rule out clonal hematopoiesis. A supplemental report will follow.     Bone marrow karyotype results: 46, XX[20], female karyotype.     OncoHeme (NGS) Hematologic Neoplasms, BM: No pathogenic genetic alteration is detected in the listed gene  regions. This finding does not exclude the presence of genetic alteration occurring at allele frequency below our  established detection limit of 5-10%, or other genetic alterations present in untested gene regions.     Interval History:   Ms. Arevalo returns today for follow up. Feeling well. Taking baby aspirin daily. She does bruise easily now that she is on aspirin. No bleeding. Occasional hot flashes.      ROS:   See HPI. Otherwise neg.      Physical Examination:   Vital signs reviewed.   Gen: well hydrated, well developed, in no acute distress.  HEENT: normocephalic, anicteric, PERRLA, EOMI, oropharynx clear  Neck: supple, no JVD, thyromegaly, cervical or supraclavicular LAD  Lungs: CTAB, no wheezes or rales  Heart: RRR, no M/R/G  Abdomen: soft, no tenderness, non-distended, no hepatosplenomegaly, mass, or hernia.   Ext: no cyanosis, edema, deformity  Neuro: alert and oriented x 4, no focal neuro deficit  Skin: no rash, open wounds or ulcers  Psych: pleasant and appropriate mood and affect     Objective:      Laboratory Data:  Platelet count yesterday was 565        Assessment/Plan:      1. Thrombocytosis    2. Easy bruising        - Ms Arevalo is a 55 yo woman with chronic thrombocytosis. Extensive workup including a bone marrow biopsy have been unremarkable. Thrombocytosis likely reactive  - c/w baby aspirin daily. She does have a strong family history of stroke and heart attack  - asked patient to stop aspirin if she develops significant bleeding  - RTC in one year for follow up  - Knows to call should questions arise or symptoms develop

## 2020-07-08 ENCOUNTER — PATIENT MESSAGE (OUTPATIENT)
Dept: INTERNAL MEDICINE | Facility: CLINIC | Age: 55
End: 2020-07-08

## 2020-08-03 DIAGNOSIS — E78.2 MIXED HYPERLIPIDEMIA: Primary | ICD-10-CM

## 2020-08-03 DIAGNOSIS — E03.9 HYPOTHYROIDISM, UNSPECIFIED TYPE: ICD-10-CM

## 2020-08-03 RX ORDER — FENOFIBRATE 200 MG/1
200 CAPSULE ORAL
Qty: 90 CAPSULE | Refills: 3 | Status: SHIPPED | OUTPATIENT
Start: 2020-08-03 | End: 2020-08-06 | Stop reason: SDUPTHER

## 2020-08-06 RX ORDER — FENOFIBRATE 200 MG/1
200 CAPSULE ORAL
Qty: 30 CAPSULE | Refills: 0 | Status: SHIPPED | OUTPATIENT
Start: 2020-08-06 | End: 2021-08-16

## 2020-08-20 ENCOUNTER — PATIENT OUTREACH (OUTPATIENT)
Dept: ADMINISTRATIVE | Facility: HOSPITAL | Age: 55
End: 2020-08-20

## 2020-08-31 ENCOUNTER — LAB VISIT (OUTPATIENT)
Dept: LAB | Facility: HOSPITAL | Age: 55
End: 2020-08-31
Attending: INTERNAL MEDICINE
Payer: COMMERCIAL

## 2020-08-31 DIAGNOSIS — E78.2 MIXED HYPERLIPIDEMIA: ICD-10-CM

## 2020-08-31 DIAGNOSIS — E03.9 HYPOTHYROIDISM, UNSPECIFIED TYPE: ICD-10-CM

## 2020-08-31 LAB
ALBUMIN SERPL BCP-MCNC: 4.2 G/DL (ref 3.5–5.2)
ALP SERPL-CCNC: 48 U/L (ref 55–135)
ALT SERPL W/O P-5'-P-CCNC: 13 U/L (ref 10–44)
ANION GAP SERPL CALC-SCNC: 9 MMOL/L (ref 8–16)
AST SERPL-CCNC: 19 U/L (ref 10–40)
BILIRUB SERPL-MCNC: 0.3 MG/DL (ref 0.1–1)
BUN SERPL-MCNC: 16 MG/DL (ref 6–20)
CALCIUM SERPL-MCNC: 9.6 MG/DL (ref 8.7–10.5)
CHLORIDE SERPL-SCNC: 107 MMOL/L (ref 95–110)
CHOLEST SERPL-MCNC: 211 MG/DL (ref 120–199)
CHOLEST/HDLC SERPL: 4.7 {RATIO} (ref 2–5)
CO2 SERPL-SCNC: 25 MMOL/L (ref 23–29)
CREAT SERPL-MCNC: 1 MG/DL (ref 0.5–1.4)
EST. GFR  (AFRICAN AMERICAN): >60 ML/MIN/1.73 M^2
EST. GFR  (NON AFRICAN AMERICAN): >60 ML/MIN/1.73 M^2
GLUCOSE SERPL-MCNC: 89 MG/DL (ref 70–110)
HDLC SERPL-MCNC: 45 MG/DL (ref 40–75)
HDLC SERPL: 21.3 % (ref 20–50)
LDLC SERPL CALC-MCNC: 135 MG/DL (ref 63–159)
NONHDLC SERPL-MCNC: 166 MG/DL
POTASSIUM SERPL-SCNC: 4.3 MMOL/L (ref 3.5–5.1)
PROT SERPL-MCNC: 7.5 G/DL (ref 6–8.4)
SODIUM SERPL-SCNC: 141 MMOL/L (ref 136–145)
T4 FREE SERPL-MCNC: 1.07 NG/DL (ref 0.71–1.51)
TRIGL SERPL-MCNC: 155 MG/DL (ref 30–150)
TSH SERPL DL<=0.005 MIU/L-ACNC: 3.71 UIU/ML (ref 0.4–4)

## 2020-08-31 PROCEDURE — 84443 ASSAY THYROID STIM HORMONE: CPT

## 2020-08-31 PROCEDURE — 84439 ASSAY OF FREE THYROXINE: CPT

## 2020-08-31 PROCEDURE — 80053 COMPREHEN METABOLIC PANEL: CPT

## 2020-08-31 PROCEDURE — 80061 LIPID PANEL: CPT

## 2020-08-31 PROCEDURE — 36415 COLL VENOUS BLD VENIPUNCTURE: CPT | Mod: PO

## 2020-09-02 ENCOUNTER — TELEPHONE (OUTPATIENT)
Dept: INTERNAL MEDICINE | Facility: CLINIC | Age: 55
End: 2020-09-02

## 2020-09-02 DIAGNOSIS — D75.839 THROMBOCYTOSIS: Primary | ICD-10-CM

## 2020-09-02 NOTE — TELEPHONE ENCOUNTER
Spoke with pt to r/s appointment. She wants to know is she still needs to have her cbc lab done every 6 months for thrombocytosis.

## 2020-09-11 DIAGNOSIS — Z12.39 BREAST CANCER SCREENING: ICD-10-CM

## 2020-09-18 NOTE — TELEPHONE ENCOUNTER
Spoke with pt, they had a verbal understanding.        MD Lynne Chicas MA   Caller: Unspecified (2 weeks ago)             Per heme/onc note she only needs this once a year now. She should follow up with dr Loco in January of 2021

## 2020-09-21 ENCOUNTER — OFFICE VISIT (OUTPATIENT)
Dept: INTERNAL MEDICINE | Facility: CLINIC | Age: 55
End: 2020-09-21
Attending: INTERNAL MEDICINE
Payer: COMMERCIAL

## 2020-09-21 VITALS
OXYGEN SATURATION: 98 % | SYSTOLIC BLOOD PRESSURE: 122 MMHG | HEIGHT: 66 IN | WEIGHT: 159.63 LBS | DIASTOLIC BLOOD PRESSURE: 84 MMHG | HEART RATE: 90 BPM | BODY MASS INDEX: 25.66 KG/M2

## 2020-09-21 DIAGNOSIS — B02.39 HERPES ZOSTER OF EYELID: ICD-10-CM

## 2020-09-21 DIAGNOSIS — K63.5 POLYP OF COLON, UNSPECIFIED PART OF COLON, UNSPECIFIED TYPE: ICD-10-CM

## 2020-09-21 DIAGNOSIS — C44.621 SQUAMOUS CELL CARCINOMA OF UPPER EXTREMITY, UNSPECIFIED LATERALITY: ICD-10-CM

## 2020-09-21 DIAGNOSIS — Z11.4 SCREENING FOR HIV WITHOUT PRESENCE OF RISK FACTORS: ICD-10-CM

## 2020-09-21 DIAGNOSIS — Z00.00 ANNUAL PHYSICAL EXAM: Primary | ICD-10-CM

## 2020-09-21 DIAGNOSIS — D75.839 THROMBOCYTOSIS: ICD-10-CM

## 2020-09-21 PROCEDURE — 3008F BODY MASS INDEX DOCD: CPT | Mod: CPTII,S$GLB,, | Performed by: INTERNAL MEDICINE

## 2020-09-21 PROCEDURE — 3008F PR BODY MASS INDEX (BMI) DOCUMENTED: ICD-10-PCS | Mod: CPTII,S$GLB,, | Performed by: INTERNAL MEDICINE

## 2020-09-21 PROCEDURE — 99396 PR PREVENTIVE VISIT,EST,40-64: ICD-10-PCS | Mod: S$GLB,,, | Performed by: INTERNAL MEDICINE

## 2020-09-21 PROCEDURE — 99396 PREV VISIT EST AGE 40-64: CPT | Mod: S$GLB,,, | Performed by: INTERNAL MEDICINE

## 2020-09-21 PROCEDURE — 99999 PR PBB SHADOW E&M-EST. PATIENT-LVL III: CPT | Mod: PBBFAC,,, | Performed by: INTERNAL MEDICINE

## 2020-09-21 PROCEDURE — 99999 PR PBB SHADOW E&M-EST. PATIENT-LVL III: ICD-10-PCS | Mod: PBBFAC,,, | Performed by: INTERNAL MEDICINE

## 2020-09-21 RX ORDER — LEVOTHYROXINE SODIUM 50 UG/1
50 TABLET ORAL DAILY
Qty: 90 TABLET | Refills: 3 | Status: SHIPPED | OUTPATIENT
Start: 2020-09-21 | End: 2021-10-04

## 2020-09-21 NOTE — Clinical Note
Please request pap and mammo records from her gyn, dr morena lópez at [facility]. Thanks!

## 2020-09-21 NOTE — PROGRESS NOTES
Subjective:       Patient ID: Ivonne Arevalo is a 55 y.o. female.    Chief Complaint: Annual Exam     Ivonne Arevalo is a 55 y.o.  female who presents for Annual Exam  .  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 Pt is not on any medication.Pt attempts to follow a low cholesterol diet and exercise. No CP, SOB, orthopnea or PND.          Thyroid Problem  Presents for follow-up visit. Patient reports no constipation, depressed mood, diarrhea, dry skin, fatigue, palpitations, weight gain or weight loss.     Health Maintenance       Date Due Completion Date    HIV Screening 01/31/1980 ---    Pneumococcal Vaccine (Highest Risk) (1 of 3 - PCV13) 01/31/1984 ---    Shingles Vaccine (1 of 2) 01/31/2015 ---    Cervical Cancer Screening 04/27/2019 4/27/2016 (Done)    Override on 4/27/2016: Done    Override on 11/14/2013: Done    Mammogram 06/04/2020 6/4/2019    Override on 12/18/2017: Done (done at [facility])    Override on 12/14/2016: Done (at [facility])    Override on 11/11/2015: Done    Override on 10/13/2014: Done    Override on 11/14/2013: Done    Influenza Vaccine (1) 08/01/2020 10/29/2019    Lipid Panel 08/31/2025 8/31/2020    Colorectal Cancer Screening 05/03/2026 5/3/2016    Override on 4/21/2016: Declined (scheduled may 3rd with dr salgado)    TETANUS VACCINE 07/15/2029 7/15/2019          Review of Systems   Constitutional: Negative for chills, fatigue, fever, unexpected weight change, weight gain and weight loss.   HENT: Negative for ear pain and sore throat.    Respiratory: Negative for cough and shortness of breath.    Cardiovascular: Negative for chest pain and palpitations.   Gastrointestinal: Negative for abdominal pain, constipation, diarrhea, nausea and vomiting.   Genitourinary: Negative for dysuria and hematuria.    Musculoskeletal: Negative for arthralgias and myalgias.         Past Medical History:   Diagnosis Date    Basal cell cancer     followed by Dr Burnett    Family history of diabetes mellitus (DM)     Family history of early CAD     Mild hyperlipidemia     Stress incontinence        Past Surgical History:   Procedure Laterality Date    BONE MARROW BIOPSY Left 6/28/2018    Procedure: Biopsy-bone marrow;  Surgeon: Saul Freitas MD;  Location: [facility] OR [address] [place] [address];  Service: Oncology;  Laterality: Left;    CERVICAL DISC ARTHROPLASTY  2008    ENDOMETRIAL ABLATION      hammertoe surgery  4/26/15    right    TOE SURGERY Right     TOTAL SHOULDER ARTHROPLASTY  2008       Family History   Problem Relation Age of Onset    Depression Mother     Hypertension Mother     Heart disease Mother     Stroke Mother     Alzheimer's disease Mother     Diabetes Father     Heart disease Father     Hypertension Father     Kidney disease Father     Cataracts Father     Cirrhosis Father     Hyperlipidemia Sister     Arthritis Brother     Coronary artery disease Brother 42             Hypertension Brother     Heart attack Brother     Heart disease Brother     Heart disease Maternal Aunt     Hypertension Maternal Aunt     Cancer Maternal Uncle         unknown primary    Heart disease Maternal Uncle     Hypertension Maternal Uncle     Arthritis Maternal Grandmother     Diabetes Maternal Grandmother     Heart disease Maternal Grandmother     Hypertension Maternal Grandmother     Stroke Sister 50    Coronary artery disease Sister 56    Hyperlipidemia Sister     Diabetes Paternal Grandmother     Heart disease Paternal Grandmother     Amblyopia Neg Hx     Blindness Neg Hx     Glaucoma Neg Hx     Macular degeneration Neg Hx     Strabismus Neg Hx     Retinal detachment Neg Hx        Social History     Tobacco Use    Smoking status: Never Smoker    Smokeless tobacco: Never Used   Substance Use Topics  "   Alcohol use: Yes     Alcohol/week: 6.0 standard drinks     Types: 2 Glasses of wine, 4 Standard drinks or equivalent per week    Drug use: No             Objective:   Blood pressure 122/84, pulse 90, height 5' 5.5" (1.664 m), weight 72.4 kg (159 lb 9.8 oz), SpO2 98 %.     Physical Exam  Constitutional:       General: She is not in acute distress.     Appearance: She is well-developed.   HENT:      Head: Normocephalic and atraumatic.      Right Ear: Tympanic membrane and external ear normal.      Left Ear: Tympanic membrane and external ear normal.      Mouth/Throat:      Pharynx: No oropharyngeal exudate.   Eyes:      General:         Right eye: No discharge.         Left eye: No discharge.      Conjunctiva/sclera: Conjunctivae normal.   Neck:      Thyroid: No thyromegaly.   Cardiovascular:      Heart sounds: Normal heart sounds. No murmur. No friction rub. No gallop.    Pulmonary:      Effort: Pulmonary effort is normal.      Breath sounds: Normal breath sounds. No wheezing or rales.   Abdominal:      General: Bowel sounds are normal. There is no distension.      Palpations: Abdomen is soft.      Tenderness: There is no abdominal tenderness.   Musculoskeletal:         General: No tenderness.   Lymphadenopathy:      Cervical: No cervical adenopathy.   Skin:     General: Skin is warm and dry.   Neurological:      Mental Status: She is alert and oriented to person, place, and time.   Psychiatric:         Thought Content: Thought content normal.         Prior labs reviewed  Assessment/Plan:        Ivonne was seen today for annual exam.    Diagnoses and all orders for this visit:    Annual physical exam  Recommend daily sunscreen, cardiovascular exercise min 30 min 5 days per week. Seatbelts routinely. Masks over mouth and nose when in contact with others outside of your immediate household. Wash hands and surfaces frequently to avoid contact with viruses.  Recommend monthly self breast exams and annual mammogram " OVER AGE 40 or as recommended.  Also screening  pap with reflex HPV q 3 years or as recommended by gyn provider.    Squamous cell carcinoma of upper extremity, unspecified laterality  Scheduled for surgery with dr clark    Polyp of colon, unspecified part of colon, unspecified type  Repeat colonoscopy in 2021  Followed by dr salgado    Herpes zoster of eyelid  Recommend shingles vaccine  No current issues    Thrombocytosis  follwoed by dr mi annually    aquired hypothyroidism  - Clinically euthyroid/stable  - cont current dose of levothyroxine  - annual TSH    -     levothyroxine (SYNTHROID) 50 MCG tablet; Take 1 tablet (50 mcg total) by mouth once daily.      mild hyperlipidemia, miced  Recommend heart healthy diet and exercise    Medication List with Changes/Refills   Current Medications    FENOFIBRATE MICRONIZED (LOFIBRA) 200 MG CAP    Take 1 capsule (200 mg total) by mouth daily with breakfast.    FINASTERIDE (PROPECIA) 1 MG TABLET    Take 5 mg by mouth once daily.     FLUTICASONE (FLONASE) 50 MCG/ACTUATION NASAL SPRAY    TAKE 2 SPRAY(S) (INTRANASAL) 1 TIME PER DAY IN EACH NOSTRIL    HYDROCODONE-ACETAMINOPHEN (NORCO) 7.5-325 MG PER TABLET    Take 1 tablet by mouth every 4 to 6 hours as needed.    SPIRONOLACTONE (ALDACTONE) 100 MG TABLET    Take 200 mg by mouth once daily.   Changed and/or Refilled Medications    Modified Medication Previous Medication    LEVOTHYROXINE (SYNTHROID) 50 MCG TABLET levothyroxine (SYNTHROID) 50 MCG tablet       Take 1 tablet (50 mcg total) by mouth once daily.    Take 1 tablet (50 mcg total) by mouth once daily.

## 2020-09-22 ENCOUNTER — PATIENT OUTREACH (OUTPATIENT)
Dept: ADMINISTRATIVE | Facility: HOSPITAL | Age: 55
End: 2020-09-22

## 2020-09-25 ENCOUNTER — PATIENT OUTREACH (OUTPATIENT)
Dept: ADMINISTRATIVE | Facility: HOSPITAL | Age: 55
End: 2020-09-25

## 2020-12-01 ENCOUNTER — PATIENT MESSAGE (OUTPATIENT)
Dept: HEMATOLOGY/ONCOLOGY | Facility: CLINIC | Age: 55
End: 2020-12-01

## 2020-12-21 ENCOUNTER — LAB VISIT (OUTPATIENT)
Dept: LAB | Facility: HOSPITAL | Age: 55
End: 2020-12-21
Attending: INTERNAL MEDICINE
Payer: COMMERCIAL

## 2020-12-21 DIAGNOSIS — D75.839 THROMBOCYTOSIS: ICD-10-CM

## 2020-12-21 LAB
BASOPHILS # BLD AUTO: 0.06 K/UL (ref 0–0.2)
BASOPHILS NFR BLD: 0.7 % (ref 0–1.9)
CRP SERPL-MCNC: 5.9 MG/L (ref 0–8.2)
DIFFERENTIAL METHOD: ABNORMAL
EOSINOPHIL # BLD AUTO: 0.1 K/UL (ref 0–0.5)
EOSINOPHIL NFR BLD: 0.7 % (ref 0–8)
ERYTHROCYTE [DISTWIDTH] IN BLOOD BY AUTOMATED COUNT: 11.1 % (ref 11.5–14.5)
ERYTHROCYTE [SEDIMENTATION RATE] IN BLOOD BY WESTERGREN METHOD: 4 MM/HR (ref 0–36)
FERRITIN SERPL-MCNC: 427 NG/ML (ref 20–300)
HCT VFR BLD AUTO: 41 % (ref 37–48.5)
HGB BLD-MCNC: 13.5 G/DL (ref 12–16)
IMM GRANULOCYTES # BLD AUTO: 0.03 K/UL (ref 0–0.04)
IMM GRANULOCYTES NFR BLD AUTO: 0.4 % (ref 0–0.5)
IRON SERPL-MCNC: 137 UG/DL (ref 30–160)
LYMPHOCYTES # BLD AUTO: 2.8 K/UL (ref 1–4.8)
LYMPHOCYTES NFR BLD: 34.1 % (ref 18–48)
MCH RBC QN AUTO: 32.1 PG (ref 27–31)
MCHC RBC AUTO-ENTMCNC: 32.9 G/DL (ref 32–36)
MCV RBC AUTO: 97 FL (ref 82–98)
MONOCYTES # BLD AUTO: 0.5 K/UL (ref 0.3–1)
MONOCYTES NFR BLD: 6.3 % (ref 4–15)
NEUTROPHILS # BLD AUTO: 4.8 K/UL (ref 1.8–7.7)
NEUTROPHILS NFR BLD: 57.8 % (ref 38–73)
NRBC BLD-RTO: 0 /100 WBC
PLATELET # BLD AUTO: 476 K/UL (ref 150–350)
PMV BLD AUTO: 10.3 FL (ref 9.2–12.9)
RBC # BLD AUTO: 4.21 M/UL (ref 4–5.4)
SATURATED IRON: 33 % (ref 20–50)
TOTAL IRON BINDING CAPACITY: 419 UG/DL (ref 250–450)
TRANSFERRIN SERPL-MCNC: 283 MG/DL (ref 200–375)
WBC # BLD AUTO: 8.23 K/UL (ref 3.9–12.7)

## 2020-12-21 PROCEDURE — 85025 COMPLETE CBC W/AUTO DIFF WBC: CPT

## 2020-12-21 PROCEDURE — 86140 C-REACTIVE PROTEIN: CPT

## 2020-12-21 PROCEDURE — 36415 COLL VENOUS BLD VENIPUNCTURE: CPT | Mod: PO

## 2020-12-21 PROCEDURE — 83540 ASSAY OF IRON: CPT

## 2020-12-21 PROCEDURE — 85652 RBC SED RATE AUTOMATED: CPT

## 2020-12-21 PROCEDURE — 82728 ASSAY OF FERRITIN: CPT

## 2020-12-21 NOTE — PROGRESS NOTES
Subjective:       Patient ID: Ivonne Arevalo is a 55 y.o. female.     Chief Complaint:  F/u for thrombocytosis     Hematologic History:  1. Ms. Arevalo is a 52 yo woman who saw me on 6/14/18 for easy bruising and thrombocytopenia. She has had easy bruising for many years, but notices it is getting worse more recently. Also has nose bleeds every now and then which usually takes 15-20 minutes to stop. Had cervical spine surgery 10 years ago without complications.  Plt count was 415 in 2011, 437 on 12/10/2013. On 5/30/2018, WBC 8.83, Hb 14.2, Hct 42.7, plt count 462. CMP normal. On 6/4/2018, INR, APTT normal. Plt function test normal.  2. VWF antigen and activity on 6/14/18 normal. Ferritin mildly elevated at 332, iron studies normal. CRP normal, ESR mildly elevated at 27. JAK2, MPL, MELISSA negative. BCR/ABL negative.   3. Underwent BMBx on 6/28/18 for thrombocytosis.   CELLULARITY=60-70%, TRILINEAGE HEMATOPOIETIC ACTIVITY (M/E=3.9:1). SEE COMMENT.  ADEQUATE STORAGE IRON.  ADEQUATE NUMBER OF MEGAKARYOCYTES.  COMMENT: Flow cytometry analysis of bone marrow aspirate shows lymph gate(6%) containing T and B cells. No  B cell clonality or T-cell aberrancy is evident. Blast gate is not increased.  Immunohistochemical studies were performed on the core biopsy for further architecture evaluation with adequate  positive and negative controls. Scattered mixed predominantly T cells (CD3 positive) and B cells (CD20 positive)  are evident. About 4-5% plasma cells ( positive) are noted. CD61 highlights adequate number of  megakaryocytes.  See ABEL report:  Peripheral blood, JAK2 V617F mutation analysis: Negative for JAK2 V617F.  Peripheral blood, CALR mutation analysis, exon 9. Negative. No deletion or insertion was detected in CALR, exon  9.  BCR/ABL, FISH (D-FISH), Blood: The result is within normal limits for the BCR and ABL1.  There is no definitive morphologic evidence of myeloproliferative neoplasm. Correlate with a cytogenetics  report  and NGS report to rule out clonal hematopoiesis. A supplemental report will follow.     Bone marrow karyotype results: 46, XX[20], female karyotype.     OncoHeme (NGS) Hematologic Neoplasms, BM: No pathogenic genetic alteration is detected in the listed gene  regions. This finding does not exclude the presence of genetic alteration occurring at allele frequency below our  established detection limit of 5-10%, or other genetic alterations present in untested gene regions.     Interval History:   Ms. Arevalo returns today for follow up. Feeling well. Taking baby aspirin daily. No problem with aspirin. She had a basal cell carcinoma on her right forearm removed in Oct. Had reaction to the stitches which were removed.      ROS:   See HPI. Otherwise neg.      Physical Examination:   Vital signs reviewed.   Gen: well hydrated, well developed, in no acute distress.  HEENT: normocephalic, anicteric, PERRLA, EOMI, oropharynx clear  Neck: supple, no JVD, thyromegaly, cervical or supraclavicular LAD  Lungs: CTAB, no wheezes or rales  Heart: RRR, no M/R/G  Abdomen: soft, no tenderness, non-distended, no hepatosplenomegaly, mass, or hernia.   Ext: no cyanosis, edema, deformity  Neuro: alert and oriented x 4, no focal neuro deficit  Skin: very mild erythema at the incision site of the right forearm  Psych: pleasant and appropriate mood and affect     Objective:      Laboratory Data:  Reviewed lab results with patient. Platelet count 476 stable        Assessment/Plan:      1. Thrombocytosis    2. Family history of early CAD      1.2  - Ms Arevalo is a 56 yo woman with chronic thrombocytosis. Extensive workup including a bone marrow biopsy have been unremarkable. Thrombocytosis likely reactive  - reviewed lab result with patient. Platelet count elevated but remains stable. ESR/CRP negative. Ferritin level is mildly elevated which is chronic   - c/w baby aspirin daily. She does have a strong family history of stroke and heart  attack  - RTC in one year for follow up  - Knows to call should questions arise or symptoms develop

## 2020-12-22 ENCOUNTER — OFFICE VISIT (OUTPATIENT)
Dept: HEMATOLOGY/ONCOLOGY | Facility: CLINIC | Age: 55
End: 2020-12-22
Payer: COMMERCIAL

## 2020-12-22 VITALS
OXYGEN SATURATION: 100 % | RESPIRATION RATE: 16 BRPM | TEMPERATURE: 99 F | SYSTOLIC BLOOD PRESSURE: 138 MMHG | BODY MASS INDEX: 25.72 KG/M2 | HEIGHT: 66 IN | DIASTOLIC BLOOD PRESSURE: 78 MMHG | WEIGHT: 160.06 LBS | HEART RATE: 73 BPM

## 2020-12-22 DIAGNOSIS — Z82.49 FAMILY HISTORY OF EARLY CAD: ICD-10-CM

## 2020-12-22 DIAGNOSIS — D75.839 THROMBOCYTOSIS: Primary | ICD-10-CM

## 2020-12-22 PROCEDURE — 1126F AMNT PAIN NOTED NONE PRSNT: CPT | Mod: S$GLB,,, | Performed by: INTERNAL MEDICINE

## 2020-12-22 PROCEDURE — 99214 PR OFFICE/OUTPT VISIT, EST, LEVL IV, 30-39 MIN: ICD-10-PCS | Mod: S$GLB,,, | Performed by: INTERNAL MEDICINE

## 2020-12-22 PROCEDURE — 1126F PR PAIN SEVERITY QUANTIFIED, NO PAIN PRESENT: ICD-10-PCS | Mod: S$GLB,,, | Performed by: INTERNAL MEDICINE

## 2020-12-22 PROCEDURE — 99214 OFFICE O/P EST MOD 30 MIN: CPT | Mod: S$GLB,,, | Performed by: INTERNAL MEDICINE

## 2020-12-22 PROCEDURE — 99999 PR PBB SHADOW E&M-EST. PATIENT-LVL III: CPT | Mod: PBBFAC,,, | Performed by: INTERNAL MEDICINE

## 2020-12-22 PROCEDURE — 99999 PR PBB SHADOW E&M-EST. PATIENT-LVL III: ICD-10-PCS | Mod: PBBFAC,,, | Performed by: INTERNAL MEDICINE

## 2020-12-22 PROCEDURE — 3008F BODY MASS INDEX DOCD: CPT | Mod: CPTII,S$GLB,, | Performed by: INTERNAL MEDICINE

## 2020-12-22 PROCEDURE — 3008F PR BODY MASS INDEX (BMI) DOCUMENTED: ICD-10-PCS | Mod: CPTII,S$GLB,, | Performed by: INTERNAL MEDICINE

## 2021-01-29 ENCOUNTER — PATIENT MESSAGE (OUTPATIENT)
Dept: INTERNAL MEDICINE | Facility: CLINIC | Age: 56
End: 2021-01-29

## 2021-01-29 DIAGNOSIS — U07.1 COVID-19 VIRUS INFECTION: Primary | ICD-10-CM

## 2021-01-29 RX ORDER — BENZONATATE 200 MG/1
200 CAPSULE ORAL 3 TIMES DAILY PRN
Qty: 30 CAPSULE | Refills: 0 | Status: SHIPPED | OUTPATIENT
Start: 2021-01-29 | End: 2021-02-28

## 2021-01-29 RX ORDER — ALBUTEROL SULFATE 90 UG/1
2 AEROSOL, METERED RESPIRATORY (INHALATION) EVERY 6 HOURS PRN
Qty: 18 G | Refills: 0 | Status: SHIPPED | OUTPATIENT
Start: 2021-01-29 | End: 2021-02-25

## 2021-02-01 ENCOUNTER — PATIENT MESSAGE (OUTPATIENT)
Dept: ADMINISTRATIVE | Facility: OTHER | Age: 56
End: 2021-02-01

## 2021-02-02 ENCOUNTER — PATIENT MESSAGE (OUTPATIENT)
Dept: ADMINISTRATIVE | Facility: OTHER | Age: 56
End: 2021-02-02

## 2021-02-02 ENCOUNTER — NURSE TRIAGE (OUTPATIENT)
Dept: ADMINISTRATIVE | Facility: CLINIC | Age: 56
End: 2021-02-02

## 2021-02-02 ENCOUNTER — OFFICE VISIT (OUTPATIENT)
Dept: URGENT CARE | Facility: CLINIC | Age: 56
End: 2021-02-02
Payer: COMMERCIAL

## 2021-02-02 ENCOUNTER — TELEPHONE (OUTPATIENT)
Dept: ADMINISTRATIVE | Facility: CLINIC | Age: 56
End: 2021-02-02

## 2021-02-02 VITALS
SYSTOLIC BLOOD PRESSURE: 121 MMHG | HEIGHT: 65 IN | RESPIRATION RATE: 16 BRPM | TEMPERATURE: 98 F | OXYGEN SATURATION: 98 % | HEART RATE: 108 BPM | DIASTOLIC BLOOD PRESSURE: 74 MMHG | WEIGHT: 160 LBS | BODY MASS INDEX: 26.66 KG/M2

## 2021-02-02 DIAGNOSIS — J98.01 BRONCHOSPASM: ICD-10-CM

## 2021-02-02 DIAGNOSIS — U07.1 COVID-19 VIRUS INFECTION: ICD-10-CM

## 2021-02-02 DIAGNOSIS — J40 BRONCHITIS: Primary | ICD-10-CM

## 2021-02-02 DIAGNOSIS — R05.9 COUGH: ICD-10-CM

## 2021-02-02 PROCEDURE — 99214 PR OFFICE/OUTPT VISIT, EST, LEVL IV, 30-39 MIN: ICD-10-PCS | Mod: 25,S$GLB,, | Performed by: PHYSICIAN ASSISTANT

## 2021-02-02 PROCEDURE — 71046 X-RAY EXAM CHEST 2 VIEWS: CPT | Mod: S$GLB,,, | Performed by: RADIOLOGY

## 2021-02-02 PROCEDURE — 99214 OFFICE O/P EST MOD 30 MIN: CPT | Mod: 25,S$GLB,, | Performed by: PHYSICIAN ASSISTANT

## 2021-02-02 PROCEDURE — 96372 THER/PROPH/DIAG INJ SC/IM: CPT | Mod: S$GLB,,, | Performed by: PHYSICIAN ASSISTANT

## 2021-02-02 PROCEDURE — 96372 PR INJECTION,THERAP/PROPH/DIAG2ST, IM OR SUBCUT: ICD-10-PCS | Mod: S$GLB,,, | Performed by: PHYSICIAN ASSISTANT

## 2021-02-02 PROCEDURE — 71046 XR CHEST PA AND LATERAL: ICD-10-PCS | Mod: S$GLB,,, | Performed by: RADIOLOGY

## 2021-02-02 PROCEDURE — 3008F BODY MASS INDEX DOCD: CPT | Mod: CPTII,S$GLB,, | Performed by: PHYSICIAN ASSISTANT

## 2021-02-02 PROCEDURE — 3008F PR BODY MASS INDEX (BMI) DOCUMENTED: ICD-10-PCS | Mod: CPTII,S$GLB,, | Performed by: PHYSICIAN ASSISTANT

## 2021-02-02 RX ORDER — PREDNISONE 20 MG/1
TABLET ORAL
Qty: 6 TABLET | Refills: 0 | Status: SHIPPED | OUTPATIENT
Start: 2021-02-02 | End: 2021-02-06

## 2021-02-02 RX ORDER — DEXAMETHASONE SODIUM PHOSPHATE 100 MG/10ML
8 INJECTION INTRAMUSCULAR; INTRAVENOUS
Status: COMPLETED | OUTPATIENT
Start: 2021-02-02 | End: 2021-02-02

## 2021-02-02 RX ORDER — CODEINE PHOSPHATE AND GUAIFENESIN 10; 100 MG/5ML; MG/5ML
5 SOLUTION ORAL EVERY 6 HOURS PRN
Qty: 120 ML | Refills: 0 | Status: SHIPPED | OUTPATIENT
Start: 2021-02-02 | End: 2021-02-07

## 2021-02-02 RX ADMIN — DEXAMETHASONE SODIUM PHOSPHATE 8 MG: 100 INJECTION INTRAMUSCULAR; INTRAVENOUS at 02:02

## 2021-02-03 ENCOUNTER — PATIENT MESSAGE (OUTPATIENT)
Dept: ADMINISTRATIVE | Facility: OTHER | Age: 56
End: 2021-02-03

## 2021-02-03 ENCOUNTER — PATIENT MESSAGE (OUTPATIENT)
Dept: ADMINISTRATIVE | Facility: CLINIC | Age: 56
End: 2021-02-03

## 2021-02-04 ENCOUNTER — PATIENT MESSAGE (OUTPATIENT)
Dept: ADMINISTRATIVE | Facility: OTHER | Age: 56
End: 2021-02-04

## 2021-02-04 ENCOUNTER — PATIENT MESSAGE (OUTPATIENT)
Dept: ADMINISTRATIVE | Facility: CLINIC | Age: 56
End: 2021-02-04

## 2021-02-05 ENCOUNTER — PATIENT MESSAGE (OUTPATIENT)
Dept: ADMINISTRATIVE | Facility: CLINIC | Age: 56
End: 2021-02-05

## 2021-02-05 ENCOUNTER — PATIENT MESSAGE (OUTPATIENT)
Dept: ADMINISTRATIVE | Facility: OTHER | Age: 56
End: 2021-02-05

## 2021-02-06 ENCOUNTER — PATIENT MESSAGE (OUTPATIENT)
Dept: ADMINISTRATIVE | Facility: OTHER | Age: 56
End: 2021-02-06

## 2021-02-07 ENCOUNTER — PATIENT MESSAGE (OUTPATIENT)
Dept: ADMINISTRATIVE | Facility: CLINIC | Age: 56
End: 2021-02-07

## 2021-02-07 ENCOUNTER — PATIENT MESSAGE (OUTPATIENT)
Dept: ADMINISTRATIVE | Facility: OTHER | Age: 56
End: 2021-02-07

## 2021-02-08 ENCOUNTER — NURSE TRIAGE (OUTPATIENT)
Dept: ADMINISTRATIVE | Facility: CLINIC | Age: 56
End: 2021-02-08

## 2021-08-11 ENCOUNTER — PATIENT MESSAGE (OUTPATIENT)
Dept: INTERNAL MEDICINE | Facility: CLINIC | Age: 56
End: 2021-08-11

## 2021-08-17 ENCOUNTER — PATIENT OUTREACH (OUTPATIENT)
Dept: ADMINISTRATIVE | Facility: HOSPITAL | Age: 56
End: 2021-08-17

## 2021-08-18 ENCOUNTER — PATIENT MESSAGE (OUTPATIENT)
Dept: RESEARCH | Facility: HOSPITAL | Age: 56
End: 2021-08-18

## 2021-10-04 RX ORDER — LEVOTHYROXINE SODIUM 50 UG/1
TABLET ORAL
Qty: 90 TABLET | Refills: 1 | Status: SHIPPED | OUTPATIENT
Start: 2021-10-04 | End: 2022-03-23

## 2021-10-19 ENCOUNTER — LAB VISIT (OUTPATIENT)
Dept: LAB | Facility: HOSPITAL | Age: 56
End: 2021-10-19
Attending: INTERNAL MEDICINE
Payer: COMMERCIAL

## 2021-10-19 DIAGNOSIS — Z11.4 SCREENING FOR HIV WITHOUT PRESENCE OF RISK FACTORS: ICD-10-CM

## 2021-10-19 DIAGNOSIS — Z00.00 ANNUAL PHYSICAL EXAM: ICD-10-CM

## 2021-10-19 LAB
ALBUMIN SERPL BCP-MCNC: 4 G/DL (ref 3.5–5.2)
ALP SERPL-CCNC: 45 U/L (ref 55–135)
ALT SERPL W/O P-5'-P-CCNC: 16 U/L (ref 10–44)
ANION GAP SERPL CALC-SCNC: 12 MMOL/L (ref 8–16)
AST SERPL-CCNC: 22 U/L (ref 10–40)
BASOPHILS # BLD AUTO: 0.07 K/UL (ref 0–0.2)
BASOPHILS NFR BLD: 0.9 % (ref 0–1.9)
BILIRUB SERPL-MCNC: 0.4 MG/DL (ref 0.1–1)
BUN SERPL-MCNC: 18 MG/DL (ref 6–20)
CALCIUM SERPL-MCNC: 9.6 MG/DL (ref 8.7–10.5)
CHLORIDE SERPL-SCNC: 106 MMOL/L (ref 95–110)
CHOLEST SERPL-MCNC: 192 MG/DL (ref 120–199)
CHOLEST/HDLC SERPL: 4.6 {RATIO} (ref 2–5)
CO2 SERPL-SCNC: 23 MMOL/L (ref 23–29)
CREAT SERPL-MCNC: 0.9 MG/DL (ref 0.5–1.4)
DIFFERENTIAL METHOD: ABNORMAL
EOSINOPHIL # BLD AUTO: 0.1 K/UL (ref 0–0.5)
EOSINOPHIL NFR BLD: 1.2 % (ref 0–8)
ERYTHROCYTE [DISTWIDTH] IN BLOOD BY AUTOMATED COUNT: 11.4 % (ref 11.5–14.5)
EST. GFR  (AFRICAN AMERICAN): >60 ML/MIN/1.73 M^2
EST. GFR  (NON AFRICAN AMERICAN): >60 ML/MIN/1.73 M^2
GLUCOSE SERPL-MCNC: 91 MG/DL (ref 70–110)
HCT VFR BLD AUTO: 41.3 % (ref 37–48.5)
HDLC SERPL-MCNC: 42 MG/DL (ref 40–75)
HDLC SERPL: 21.9 % (ref 20–50)
HGB BLD-MCNC: 13.6 G/DL (ref 12–16)
IMM GRANULOCYTES # BLD AUTO: 0.04 K/UL (ref 0–0.04)
IMM GRANULOCYTES NFR BLD AUTO: 0.5 % (ref 0–0.5)
LDLC SERPL CALC-MCNC: 111.4 MG/DL (ref 63–159)
LYMPHOCYTES # BLD AUTO: 2.1 K/UL (ref 1–4.8)
LYMPHOCYTES NFR BLD: 25.7 % (ref 18–48)
MCH RBC QN AUTO: 31.9 PG (ref 27–31)
MCHC RBC AUTO-ENTMCNC: 32.9 G/DL (ref 32–36)
MCV RBC AUTO: 97 FL (ref 82–98)
MONOCYTES # BLD AUTO: 0.7 K/UL (ref 0.3–1)
MONOCYTES NFR BLD: 8.2 % (ref 4–15)
NEUTROPHILS # BLD AUTO: 5.1 K/UL (ref 1.8–7.7)
NEUTROPHILS NFR BLD: 63.5 % (ref 38–73)
NONHDLC SERPL-MCNC: 150 MG/DL
NRBC BLD-RTO: 0 /100 WBC
PLATELET # BLD AUTO: 490 K/UL (ref 150–450)
PMV BLD AUTO: 9.8 FL (ref 9.2–12.9)
POTASSIUM SERPL-SCNC: 4.4 MMOL/L (ref 3.5–5.1)
PROT SERPL-MCNC: 7.2 G/DL (ref 6–8.4)
RBC # BLD AUTO: 4.27 M/UL (ref 4–5.4)
SODIUM SERPL-SCNC: 141 MMOL/L (ref 136–145)
T4 FREE SERPL-MCNC: 0.91 NG/DL (ref 0.71–1.51)
TRIGL SERPL-MCNC: 193 MG/DL (ref 30–150)
TSH SERPL DL<=0.005 MIU/L-ACNC: 2.53 UIU/ML (ref 0.4–4)
WBC # BLD AUTO: 8.09 K/UL (ref 3.9–12.7)

## 2021-10-19 PROCEDURE — 36415 COLL VENOUS BLD VENIPUNCTURE: CPT | Mod: PO | Performed by: INTERNAL MEDICINE

## 2021-10-19 PROCEDURE — 87389 HIV-1 AG W/HIV-1&-2 AB AG IA: CPT | Performed by: INTERNAL MEDICINE

## 2021-10-19 PROCEDURE — 80061 LIPID PANEL: CPT | Performed by: INTERNAL MEDICINE

## 2021-10-19 PROCEDURE — 84439 ASSAY OF FREE THYROXINE: CPT | Performed by: INTERNAL MEDICINE

## 2021-10-19 PROCEDURE — 80053 COMPREHEN METABOLIC PANEL: CPT | Performed by: INTERNAL MEDICINE

## 2021-10-19 PROCEDURE — 85025 COMPLETE CBC W/AUTO DIFF WBC: CPT | Performed by: INTERNAL MEDICINE

## 2021-10-19 PROCEDURE — 84443 ASSAY THYROID STIM HORMONE: CPT | Performed by: INTERNAL MEDICINE

## 2021-10-20 LAB — HIV 1+2 AB+HIV1 P24 AG SERPL QL IA: NEGATIVE

## 2021-10-21 ENCOUNTER — HOSPITAL ENCOUNTER (OUTPATIENT)
Dept: RADIOLOGY | Facility: HOSPITAL | Age: 56
Discharge: HOME | End: 2021-10-21
Attending: INTERNAL MEDICINE
Payer: COMMERCIAL

## 2021-10-21 ENCOUNTER — OFFICE VISIT (OUTPATIENT)
Dept: INTERNAL MEDICINE | Facility: CLINIC | Age: 56
End: 2021-10-21
Attending: INTERNAL MEDICINE
Payer: COMMERCIAL

## 2021-10-21 VITALS
HEART RATE: 76 BPM | WEIGHT: 166.69 LBS | BODY MASS INDEX: 27.77 KG/M2 | SYSTOLIC BLOOD PRESSURE: 112 MMHG | HEIGHT: 65 IN | DIASTOLIC BLOOD PRESSURE: 72 MMHG | OXYGEN SATURATION: 99 %

## 2021-10-21 DIAGNOSIS — D75.839 THROMBOCYTOSIS: ICD-10-CM

## 2021-10-21 DIAGNOSIS — K21.9 GASTROESOPHAGEAL REFLUX DISEASE, UNSPECIFIED WHETHER ESOPHAGITIS PRESENT: ICD-10-CM

## 2021-10-21 DIAGNOSIS — R05.9 COUGH: ICD-10-CM

## 2021-10-21 DIAGNOSIS — Z12.31 ENCOUNTER FOR SCREENING MAMMOGRAM FOR MALIGNANT NEOPLASM OF BREAST: Primary | ICD-10-CM

## 2021-10-21 DIAGNOSIS — Z00.00 ANNUAL PHYSICAL EXAM: ICD-10-CM

## 2021-10-21 DIAGNOSIS — D47.3 ESSENTIAL (HEMORRHAGIC) THROMBOCYTHEMIA: ICD-10-CM

## 2021-10-21 PROCEDURE — 71046 X-RAY EXAM CHEST 2 VIEWS: CPT | Mod: TC,PO

## 2021-10-21 PROCEDURE — 3074F PR MOST RECENT SYSTOLIC BLOOD PRESSURE < 130 MM HG: ICD-10-PCS | Mod: CPTII,S$GLB,, | Performed by: INTERNAL MEDICINE

## 2021-10-21 PROCEDURE — 99212 PR OFFICE/OUTPT VISIT, EST, LEVL II, 10-19 MIN: ICD-10-PCS | Mod: 25,S$GLB,, | Performed by: INTERNAL MEDICINE

## 2021-10-21 PROCEDURE — 3008F PR BODY MASS INDEX (BMI) DOCUMENTED: ICD-10-PCS | Mod: CPTII,S$GLB,, | Performed by: INTERNAL MEDICINE

## 2021-10-21 PROCEDURE — 99396 PREV VISIT EST AGE 40-64: CPT | Mod: S$GLB,,, | Performed by: INTERNAL MEDICINE

## 2021-10-21 PROCEDURE — 71046 X-RAY EXAM CHEST 2 VIEWS: CPT | Mod: 26,,, | Performed by: STUDENT IN AN ORGANIZED HEALTH CARE EDUCATION/TRAINING PROGRAM

## 2021-10-21 PROCEDURE — 3074F SYST BP LT 130 MM HG: CPT | Mod: CPTII,S$GLB,, | Performed by: INTERNAL MEDICINE

## 2021-10-21 PROCEDURE — 3078F DIAST BP <80 MM HG: CPT | Mod: CPTII,S$GLB,, | Performed by: INTERNAL MEDICINE

## 2021-10-21 PROCEDURE — 3078F PR MOST RECENT DIASTOLIC BLOOD PRESSURE < 80 MM HG: ICD-10-PCS | Mod: CPTII,S$GLB,, | Performed by: INTERNAL MEDICINE

## 2021-10-21 PROCEDURE — 99999 PR PBB SHADOW E&M-EST. PATIENT-LVL III: CPT | Mod: PBBFAC,,, | Performed by: INTERNAL MEDICINE

## 2021-10-21 PROCEDURE — 99212 OFFICE O/P EST SF 10 MIN: CPT | Mod: 25,S$GLB,, | Performed by: INTERNAL MEDICINE

## 2021-10-21 PROCEDURE — 71046 XR CHEST PA AND LATERAL: ICD-10-PCS | Mod: 26,,, | Performed by: STUDENT IN AN ORGANIZED HEALTH CARE EDUCATION/TRAINING PROGRAM

## 2021-10-21 PROCEDURE — 99999 PR PBB SHADOW E&M-EST. PATIENT-LVL III: ICD-10-PCS | Mod: PBBFAC,,, | Performed by: INTERNAL MEDICINE

## 2021-10-21 PROCEDURE — 3008F BODY MASS INDEX DOCD: CPT | Mod: CPTII,S$GLB,, | Performed by: INTERNAL MEDICINE

## 2021-10-21 PROCEDURE — 99396 PR PREVENTIVE VISIT,EST,40-64: ICD-10-PCS | Mod: S$GLB,,, | Performed by: INTERNAL MEDICINE

## 2021-10-21 RX ORDER — OMEPRAZOLE 20 MG/1
20 CAPSULE, DELAYED RELEASE ORAL DAILY
Qty: 14 CAPSULE | Refills: 0 | Status: SHIPPED | OUTPATIENT
Start: 2021-10-21 | End: 2022-10-25 | Stop reason: SDUPTHER

## 2021-10-21 RX ORDER — MONTELUKAST SODIUM 10 MG/1
10 TABLET ORAL NIGHTLY
Qty: 30 TABLET | Refills: 3 | Status: SHIPPED | OUTPATIENT
Start: 2021-10-21 | End: 2021-11-20

## 2021-10-22 ENCOUNTER — PATIENT MESSAGE (OUTPATIENT)
Dept: ORTHOPEDICS | Facility: CLINIC | Age: 56
End: 2021-10-22
Payer: COMMERCIAL

## 2021-10-27 ENCOUNTER — HOSPITAL ENCOUNTER (OUTPATIENT)
Dept: PULMONOLOGY | Facility: CLINIC | Age: 56
Discharge: HOME | End: 2021-10-27
Payer: COMMERCIAL

## 2021-10-27 DIAGNOSIS — R05.9 COUGH: ICD-10-CM

## 2021-10-27 PROCEDURE — 94010 BREATHING CAPACITY TEST: CPT | Mod: S$GLB,,, | Performed by: INTERNAL MEDICINE

## 2021-10-27 PROCEDURE — 94729 DIFFUSING CAPACITY: CPT | Mod: S$GLB,,, | Performed by: INTERNAL MEDICINE

## 2021-10-27 PROCEDURE — 94727 PR PULM FUNCTION TEST BY GAS: ICD-10-PCS | Mod: S$GLB,,, | Performed by: INTERNAL MEDICINE

## 2021-10-27 PROCEDURE — 94010 BREATHING CAPACITY TEST: ICD-10-PCS | Mod: S$GLB,,, | Performed by: INTERNAL MEDICINE

## 2021-10-27 PROCEDURE — 94727 GAS DIL/WSHOT DETER LNG VOL: CPT | Mod: S$GLB,,, | Performed by: INTERNAL MEDICINE

## 2021-10-27 PROCEDURE — 94729 PR C02/MEMBANE DIFFUSE CAPACITY: ICD-10-PCS | Mod: S$GLB,,, | Performed by: INTERNAL MEDICINE

## 2021-10-28 ENCOUNTER — OFFICE VISIT (OUTPATIENT)
Dept: INTERNAL MEDICINE | Facility: CLINIC | Age: 56
End: 2021-10-28
Attending: INTERNAL MEDICINE
Payer: COMMERCIAL

## 2021-10-28 DIAGNOSIS — R05.3 CHRONIC COUGH: ICD-10-CM

## 2021-10-28 DIAGNOSIS — J30.2 SEASONAL ALLERGIC RHINITIS, UNSPECIFIED TRIGGER: Primary | ICD-10-CM

## 2021-10-28 PROCEDURE — 99213 PR OFFICE/OUTPT VISIT, EST, LEVL III, 20-29 MIN: ICD-10-PCS | Mod: 95,,, | Performed by: INTERNAL MEDICINE

## 2021-10-28 PROCEDURE — 1159F MED LIST DOCD IN RCRD: CPT | Mod: CPTII,95,, | Performed by: INTERNAL MEDICINE

## 2021-10-28 PROCEDURE — 99213 OFFICE O/P EST LOW 20 MIN: CPT | Mod: 95,,, | Performed by: INTERNAL MEDICINE

## 2021-10-28 PROCEDURE — 1159F PR MEDICATION LIST DOCUMENTED IN MEDICAL RECORD: ICD-10-PCS | Mod: CPTII,95,, | Performed by: INTERNAL MEDICINE

## 2021-10-28 RX ORDER — BENZONATATE 200 MG/1
200 CAPSULE ORAL 3 TIMES DAILY PRN
Qty: 90 CAPSULE | Refills: 0 | Status: SHIPPED | OUTPATIENT
Start: 2021-10-28 | End: 2022-10-03

## 2021-11-01 ENCOUNTER — TELEPHONE (OUTPATIENT)
Dept: HEMATOLOGY/ONCOLOGY | Facility: CLINIC | Age: 56
End: 2021-11-01
Payer: COMMERCIAL

## 2021-11-10 ENCOUNTER — HOSPITAL ENCOUNTER (OUTPATIENT)
Dept: RADIOLOGY | Facility: HOSPITAL | Age: 56
Discharge: HOME | End: 2021-11-10
Attending: INTERNAL MEDICINE
Payer: COMMERCIAL

## 2021-11-10 VITALS — HEIGHT: 65 IN | WEIGHT: 165 LBS | BODY MASS INDEX: 27.49 KG/M2

## 2021-11-10 DIAGNOSIS — Z12.31 ENCOUNTER FOR SCREENING MAMMOGRAM FOR MALIGNANT NEOPLASM OF BREAST: ICD-10-CM

## 2021-11-10 PROCEDURE — 77067 SCR MAMMO BI INCL CAD: CPT | Mod: TC

## 2021-11-10 PROCEDURE — 77063 BREAST TOMOSYNTHESIS BI: CPT | Mod: 26,,, | Performed by: RADIOLOGY

## 2021-11-10 PROCEDURE — 77067 SCR MAMMO BI INCL CAD: CPT | Mod: 26,,, | Performed by: RADIOLOGY

## 2021-11-10 PROCEDURE — 77063 MAMMO DIGITAL SCREENING BILAT WITH TOMO: ICD-10-PCS | Mod: 26,,, | Performed by: RADIOLOGY

## 2021-11-10 PROCEDURE — 77067 MAMMO DIGITAL SCREENING BILAT WITH TOMO: ICD-10-PCS | Mod: 26,,, | Performed by: RADIOLOGY

## 2021-11-15 ENCOUNTER — LAB VISIT (OUTPATIENT)
Dept: LAB | Facility: HOSPITAL | Age: 56
End: 2021-11-15
Attending: INTERNAL MEDICINE
Payer: COMMERCIAL

## 2021-11-15 DIAGNOSIS — D75.839 THROMBOCYTOSIS: ICD-10-CM

## 2021-11-15 LAB
BASOPHILS # BLD AUTO: 0.06 K/UL (ref 0–0.2)
BASOPHILS NFR BLD: 0.5 % (ref 0–1.9)
CRP SERPL-MCNC: 14 MG/L (ref 0–8.2)
DIFFERENTIAL METHOD: ABNORMAL
EOSINOPHIL # BLD AUTO: 0.1 K/UL (ref 0–0.5)
EOSINOPHIL NFR BLD: 0.7 % (ref 0–8)
ERYTHROCYTE [DISTWIDTH] IN BLOOD BY AUTOMATED COUNT: 11.6 % (ref 11.5–14.5)
ERYTHROCYTE [SEDIMENTATION RATE] IN BLOOD BY WESTERGREN METHOD: 9 MM/HR (ref 0–36)
FERRITIN SERPL-MCNC: 406 NG/ML (ref 20–300)
HCT VFR BLD AUTO: 41.3 % (ref 37–48.5)
HGB BLD-MCNC: 13.7 G/DL (ref 12–16)
IMM GRANULOCYTES # BLD AUTO: 0.05 K/UL (ref 0–0.04)
IMM GRANULOCYTES NFR BLD AUTO: 0.5 % (ref 0–0.5)
IRON SERPL-MCNC: 113 UG/DL (ref 30–160)
LYMPHOCYTES # BLD AUTO: 2.4 K/UL (ref 1–4.8)
LYMPHOCYTES NFR BLD: 21.8 % (ref 18–48)
MCH RBC QN AUTO: 32.2 PG (ref 27–31)
MCHC RBC AUTO-ENTMCNC: 33.2 G/DL (ref 32–36)
MCV RBC AUTO: 97 FL (ref 82–98)
MONOCYTES # BLD AUTO: 0.8 K/UL (ref 0.3–1)
MONOCYTES NFR BLD: 7.3 % (ref 4–15)
NEUTROPHILS # BLD AUTO: 7.6 K/UL (ref 1.8–7.7)
NEUTROPHILS NFR BLD: 69.2 % (ref 38–73)
NRBC BLD-RTO: 0 /100 WBC
PLATELET # BLD AUTO: 483 K/UL (ref 150–450)
PMV BLD AUTO: 9.8 FL (ref 9.2–12.9)
RBC # BLD AUTO: 4.25 M/UL (ref 4–5.4)
SATURATED IRON: 27 % (ref 20–50)
TOTAL IRON BINDING CAPACITY: 419 UG/DL (ref 250–450)
TRANSFERRIN SERPL-MCNC: 283 MG/DL (ref 200–375)
WBC # BLD AUTO: 10.93 K/UL (ref 3.9–12.7)

## 2021-11-15 PROCEDURE — 36415 COLL VENOUS BLD VENIPUNCTURE: CPT | Mod: PO | Performed by: INTERNAL MEDICINE

## 2021-11-15 PROCEDURE — 85025 COMPLETE CBC W/AUTO DIFF WBC: CPT | Performed by: INTERNAL MEDICINE

## 2021-11-15 PROCEDURE — 84466 ASSAY OF TRANSFERRIN: CPT | Performed by: INTERNAL MEDICINE

## 2021-11-15 PROCEDURE — 85652 RBC SED RATE AUTOMATED: CPT | Performed by: INTERNAL MEDICINE

## 2021-11-15 PROCEDURE — 86140 C-REACTIVE PROTEIN: CPT | Performed by: INTERNAL MEDICINE

## 2021-11-15 PROCEDURE — 82728 ASSAY OF FERRITIN: CPT | Performed by: INTERNAL MEDICINE

## 2021-11-16 PROBLEM — D47.3 ESSENTIAL (HEMORRHAGIC) THROMBOCYTHEMIA: Status: RESOLVED | Noted: 2018-06-28 | Resolved: 2021-11-16

## 2021-11-17 ENCOUNTER — OFFICE VISIT (OUTPATIENT)
Dept: HEMATOLOGY/ONCOLOGY | Facility: CLINIC | Age: 56
End: 2021-11-17
Payer: COMMERCIAL

## 2021-11-17 VITALS
SYSTOLIC BLOOD PRESSURE: 120 MMHG | DIASTOLIC BLOOD PRESSURE: 63 MMHG | TEMPERATURE: 98 F | RESPIRATION RATE: 18 BRPM | BODY MASS INDEX: 28.26 KG/M2 | HEIGHT: 65 IN | WEIGHT: 169.63 LBS | OXYGEN SATURATION: 98 % | HEART RATE: 77 BPM

## 2021-11-17 DIAGNOSIS — D75.839 THROMBOCYTOSIS: Primary | ICD-10-CM

## 2021-11-17 PROCEDURE — 1159F PR MEDICATION LIST DOCUMENTED IN MEDICAL RECORD: ICD-10-PCS | Mod: CPTII,S$GLB,, | Performed by: INTERNAL MEDICINE

## 2021-11-17 PROCEDURE — 99213 PR OFFICE/OUTPT VISIT, EST, LEVL III, 20-29 MIN: ICD-10-PCS | Mod: S$GLB,,, | Performed by: INTERNAL MEDICINE

## 2021-11-17 PROCEDURE — 99999 PR PBB SHADOW E&M-EST. PATIENT-LVL IV: ICD-10-PCS | Mod: PBBFAC,,, | Performed by: INTERNAL MEDICINE

## 2021-11-17 PROCEDURE — 3008F PR BODY MASS INDEX (BMI) DOCUMENTED: ICD-10-PCS | Mod: CPTII,S$GLB,, | Performed by: INTERNAL MEDICINE

## 2021-11-17 PROCEDURE — 3078F DIAST BP <80 MM HG: CPT | Mod: CPTII,S$GLB,, | Performed by: INTERNAL MEDICINE

## 2021-11-17 PROCEDURE — 99999 PR PBB SHADOW E&M-EST. PATIENT-LVL IV: CPT | Mod: PBBFAC,,, | Performed by: INTERNAL MEDICINE

## 2021-11-17 PROCEDURE — 1159F MED LIST DOCD IN RCRD: CPT | Mod: CPTII,S$GLB,, | Performed by: INTERNAL MEDICINE

## 2021-11-17 PROCEDURE — 3074F SYST BP LT 130 MM HG: CPT | Mod: CPTII,S$GLB,, | Performed by: INTERNAL MEDICINE

## 2021-11-17 PROCEDURE — 1160F RVW MEDS BY RX/DR IN RCRD: CPT | Mod: CPTII,S$GLB,, | Performed by: INTERNAL MEDICINE

## 2021-11-17 PROCEDURE — 3008F BODY MASS INDEX DOCD: CPT | Mod: CPTII,S$GLB,, | Performed by: INTERNAL MEDICINE

## 2021-11-17 PROCEDURE — 99213 OFFICE O/P EST LOW 20 MIN: CPT | Mod: S$GLB,,, | Performed by: INTERNAL MEDICINE

## 2021-11-17 PROCEDURE — 1160F PR REVIEW ALL MEDS BY PRESCRIBER/CLIN PHARMACIST DOCUMENTED: ICD-10-PCS | Mod: CPTII,S$GLB,, | Performed by: INTERNAL MEDICINE

## 2021-11-17 PROCEDURE — 3074F PR MOST RECENT SYSTOLIC BLOOD PRESSURE < 130 MM HG: ICD-10-PCS | Mod: CPTII,S$GLB,, | Performed by: INTERNAL MEDICINE

## 2021-11-17 PROCEDURE — 3078F PR MOST RECENT DIASTOLIC BLOOD PRESSURE < 80 MM HG: ICD-10-PCS | Mod: CPTII,S$GLB,, | Performed by: INTERNAL MEDICINE

## 2022-06-18 RX ORDER — FENOFIBRATE 200 MG/1
200 CAPSULE ORAL
Qty: 90 CAPSULE | Refills: 1 | Status: SHIPPED | OUTPATIENT
Start: 2022-06-18 | End: 2022-10-28

## 2022-06-18 NOTE — TELEPHONE ENCOUNTER
No new care gaps identified.  [facility] Embedded Care Gaps. Reference number: [id number]. 6/18/2022   3:10:15 PM CDT

## 2022-06-19 NOTE — TELEPHONE ENCOUNTER
Refill Authorization Note     Refill Decision Note   Ivonne Arevalo  is requesting a refill authorization.  Brief Assessment and Rationale for Refill:  Approve     Medication Therapy Plan:       Medication Reconciliation Completed: No   Comments:     No Care Gaps recommended.     Note composed:8:28 PM 06/18/2022

## 2022-06-22 RX ORDER — LEVOTHYROXINE SODIUM 50 UG/1
50 TABLET ORAL DAILY
Qty: 90 TABLET | Refills: 1 | Status: SHIPPED | OUTPATIENT
Start: 2022-06-22 | End: 2022-10-21 | Stop reason: SDUPTHER

## 2022-06-22 NOTE — TELEPHONE ENCOUNTER
No new care gaps identified.  [facility] Embedded Care Gaps. Reference number: [id number]. 6/22/2022   12:10:29 AM CDT

## 2022-06-22 NOTE — TELEPHONE ENCOUNTER
Refill Decision Note   Ivonne Arevalo  is requesting a refill authorization.  Brief Assessment and Rationale for Refill:  Approve     Medication Therapy Plan:       Medication Reconciliation Completed: No   Comments:     No Care Gaps recommended.     Note composed:9:43 AM 06/22/2022

## 2022-07-21 ENCOUNTER — PATIENT MESSAGE (OUTPATIENT)
Dept: INTERNAL MEDICINE | Facility: CLINIC | Age: 57
End: 2022-07-21
Payer: COMMERCIAL

## 2022-07-21 DIAGNOSIS — R05.9 COUGH: Primary | ICD-10-CM

## 2022-07-21 RX ORDER — MONTELUKAST SODIUM 10 MG/1
10 TABLET ORAL NIGHTLY
Qty: 30 TABLET | Refills: 3 | Status: SHIPPED | OUTPATIENT
Start: 2022-07-21 | End: 2022-10-18

## 2022-09-12 ENCOUNTER — PATIENT MESSAGE (OUTPATIENT)
Dept: INTERNAL MEDICINE | Facility: CLINIC | Age: 57
End: 2022-09-12
Payer: COMMERCIAL

## 2022-09-12 DIAGNOSIS — Z00.00 ANNUAL PHYSICAL EXAM: Primary | ICD-10-CM

## 2022-09-15 ENCOUNTER — PATIENT MESSAGE (OUTPATIENT)
Dept: INTERNAL MEDICINE | Facility: CLINIC | Age: 57
End: 2022-09-15
Payer: COMMERCIAL

## 2022-10-18 RX ORDER — LEVOTHYROXINE SODIUM 50 UG/1
50 TABLET ORAL DAILY
Qty: 90 TABLET | Refills: 1 | OUTPATIENT
Start: 2022-10-18

## 2022-10-18 NOTE — TELEPHONE ENCOUNTER
No new care gaps identified.  [facility] Embedded Care Gaps. Reference number: [id number]. 10/18/2022   1:28:58 PM CDT

## 2022-10-21 ENCOUNTER — LAB VISIT (OUTPATIENT)
Dept: LAB | Facility: HOSPITAL | Age: 57
End: 2022-10-21
Attending: INTERNAL MEDICINE
Payer: COMMERCIAL

## 2022-10-21 ENCOUNTER — PATIENT MESSAGE (OUTPATIENT)
Dept: INTERNAL MEDICINE | Facility: CLINIC | Age: 57
End: 2022-10-21
Payer: COMMERCIAL

## 2022-10-21 DIAGNOSIS — Z00.00 ANNUAL PHYSICAL EXAM: ICD-10-CM

## 2022-10-21 DIAGNOSIS — E03.9 HYPOTHYROIDISM, UNSPECIFIED TYPE: Primary | ICD-10-CM

## 2022-10-21 DIAGNOSIS — D75.839 THROMBOCYTOSIS: ICD-10-CM

## 2022-10-21 LAB
25(OH)D3+25(OH)D2 SERPL-MCNC: 45 NG/ML (ref 30–96)
ANION GAP SERPL CALC-SCNC: 10 MMOL/L (ref 8–16)
BASOPHILS # BLD AUTO: 0.06 K/UL (ref 0–0.2)
BASOPHILS NFR BLD: 0.8 % (ref 0–1.9)
BUN SERPL-MCNC: 16 MG/DL (ref 6–20)
CALCIUM SERPL-MCNC: 9.6 MG/DL (ref 8.7–10.5)
CHLORIDE SERPL-SCNC: 107 MMOL/L (ref 95–110)
CHOLEST SERPL-MCNC: 225 MG/DL (ref 120–199)
CHOLEST/HDLC SERPL: 5.8 {RATIO} (ref 2–5)
CO2 SERPL-SCNC: 22 MMOL/L (ref 23–29)
CREAT SERPL-MCNC: 0.9 MG/DL (ref 0.5–1.4)
CRP SERPL-MCNC: 8.9 MG/L (ref 0–8.2)
DIFFERENTIAL METHOD: ABNORMAL
EOSINOPHIL # BLD AUTO: 0.1 K/UL (ref 0–0.5)
EOSINOPHIL NFR BLD: 1 % (ref 0–8)
ERYTHROCYTE [DISTWIDTH] IN BLOOD BY AUTOMATED COUNT: 11.4 % (ref 11.5–14.5)
ERYTHROCYTE [SEDIMENTATION RATE] IN BLOOD BY PHOTOMETRIC METHOD: 15 MM/HR (ref 0–36)
EST. GFR  (NO RACE VARIABLE): >60 ML/MIN/1.73 M^2
ESTIMATED AVG GLUCOSE: 108 MG/DL (ref 68–131)
FERRITIN SERPL-MCNC: 437 NG/ML (ref 20–300)
GLUCOSE SERPL-MCNC: 87 MG/DL (ref 70–110)
HBA1C MFR BLD: 5.4 % (ref 4–5.6)
HCT VFR BLD AUTO: 41.9 % (ref 37–48.5)
HDLC SERPL-MCNC: 39 MG/DL (ref 40–75)
HDLC SERPL: 17.3 % (ref 20–50)
HGB BLD-MCNC: 13.9 G/DL (ref 12–16)
IMM GRANULOCYTES # BLD AUTO: 0.03 K/UL (ref 0–0.04)
IMM GRANULOCYTES NFR BLD AUTO: 0.4 % (ref 0–0.5)
IRON SERPL-MCNC: 123 UG/DL (ref 30–160)
LDLC SERPL CALC-MCNC: 149.4 MG/DL (ref 63–159)
LYMPHOCYTES # BLD AUTO: 2.2 K/UL (ref 1–4.8)
LYMPHOCYTES NFR BLD: 28.8 % (ref 18–48)
MCH RBC QN AUTO: 32 PG (ref 27–31)
MCHC RBC AUTO-ENTMCNC: 33.2 G/DL (ref 32–36)
MCV RBC AUTO: 96 FL (ref 82–98)
MONOCYTES # BLD AUTO: 0.7 K/UL (ref 0.3–1)
MONOCYTES NFR BLD: 8.3 % (ref 4–15)
NEUTROPHILS # BLD AUTO: 4.7 K/UL (ref 1.8–7.7)
NEUTROPHILS NFR BLD: 60.7 % (ref 38–73)
NONHDLC SERPL-MCNC: 186 MG/DL
NRBC BLD-RTO: 0 /100 WBC
PLATELET # BLD AUTO: 465 K/UL (ref 150–450)
PMV BLD AUTO: 10 FL (ref 9.2–12.9)
POTASSIUM SERPL-SCNC: 4.7 MMOL/L (ref 3.5–5.1)
RBC # BLD AUTO: 4.35 M/UL (ref 4–5.4)
SATURATED IRON: 31 % (ref 20–50)
SODIUM SERPL-SCNC: 139 MMOL/L (ref 136–145)
TOTAL IRON BINDING CAPACITY: 401 UG/DL (ref 250–450)
TRANSFERRIN SERPL-MCNC: 271 MG/DL (ref 200–375)
TRIGL SERPL-MCNC: 183 MG/DL (ref 30–150)
WBC # BLD AUTO: 7.79 K/UL (ref 3.9–12.7)

## 2022-10-21 PROCEDURE — 83036 HEMOGLOBIN GLYCOSYLATED A1C: CPT | Performed by: INTERNAL MEDICINE

## 2022-10-21 PROCEDURE — 36415 COLL VENOUS BLD VENIPUNCTURE: CPT | Mod: PO | Performed by: INTERNAL MEDICINE

## 2022-10-21 PROCEDURE — 80048 BASIC METABOLIC PNL TOTAL CA: CPT | Performed by: INTERNAL MEDICINE

## 2022-10-21 PROCEDURE — 84466 ASSAY OF TRANSFERRIN: CPT | Performed by: INTERNAL MEDICINE

## 2022-10-21 PROCEDURE — 82728 ASSAY OF FERRITIN: CPT | Performed by: INTERNAL MEDICINE

## 2022-10-21 PROCEDURE — 85652 RBC SED RATE AUTOMATED: CPT | Performed by: INTERNAL MEDICINE

## 2022-10-21 PROCEDURE — 82306 VITAMIN D 25 HYDROXY: CPT | Performed by: INTERNAL MEDICINE

## 2022-10-21 PROCEDURE — 80061 LIPID PANEL: CPT | Performed by: INTERNAL MEDICINE

## 2022-10-21 PROCEDURE — 85025 COMPLETE CBC W/AUTO DIFF WBC: CPT | Performed by: INTERNAL MEDICINE

## 2022-10-21 PROCEDURE — 86140 C-REACTIVE PROTEIN: CPT | Performed by: INTERNAL MEDICINE

## 2022-10-21 RX ORDER — LEVOTHYROXINE SODIUM 50 UG/1
50 TABLET ORAL DAILY
Qty: 90 TABLET | Refills: 0 | Status: SHIPPED | OUTPATIENT
Start: 2022-10-21 | End: 2022-10-25 | Stop reason: SDUPTHER

## 2022-10-21 NOTE — TELEPHONE ENCOUNTER
No new care gaps identified.  [facility] Embedded Care Gaps. Reference number: [id number]. 10/21/2022   1:13:17 PM CDT

## 2022-10-21 NOTE — TELEPHONE ENCOUNTER
Sent in refill   - pt had labs today but not TSH - please call lab and inquire if this can be added to blood in lab from today   - if not then can be done nonefasting in a few weeks at her convenience

## 2022-10-21 NOTE — TELEPHONE ENCOUNTER
Routed Rx refill request to provider - checked allergies/ included last office visit date    Messaged pt to inform them I forwarded their request to their PCP  Routing as high priority

## 2022-10-25 ENCOUNTER — TELEPHONE (OUTPATIENT)
Dept: ORTHOPEDICS | Facility: CLINIC | Age: 57
End: 2022-10-25
Payer: COMMERCIAL

## 2022-10-25 ENCOUNTER — LAB VISIT (OUTPATIENT)
Dept: LAB | Facility: OTHER | Age: 57
End: 2022-10-25
Attending: INTERNAL MEDICINE
Payer: COMMERCIAL

## 2022-10-25 ENCOUNTER — OFFICE VISIT (OUTPATIENT)
Dept: INTERNAL MEDICINE | Facility: CLINIC | Age: 57
End: 2022-10-25
Attending: INTERNAL MEDICINE
Payer: COMMERCIAL

## 2022-10-25 VITALS
BODY MASS INDEX: 27.92 KG/M2 | OXYGEN SATURATION: 98 % | WEIGHT: 167.56 LBS | HEIGHT: 65 IN | SYSTOLIC BLOOD PRESSURE: 118 MMHG | DIASTOLIC BLOOD PRESSURE: 78 MMHG | HEART RATE: 82 BPM

## 2022-10-25 DIAGNOSIS — E78.1 HYPERTRIGLYCERIDEMIA: ICD-10-CM

## 2022-10-25 DIAGNOSIS — E78.5 MILD HYPERLIPIDEMIA: ICD-10-CM

## 2022-10-25 DIAGNOSIS — Z82.49 FAMILY HISTORY OF EARLY CAD: ICD-10-CM

## 2022-10-25 DIAGNOSIS — D75.839 THROMBOCYTOSIS: ICD-10-CM

## 2022-10-25 DIAGNOSIS — E03.9 HYPOTHYROIDISM, UNSPECIFIED TYPE: ICD-10-CM

## 2022-10-25 DIAGNOSIS — K21.00 GASTROESOPHAGEAL REFLUX DISEASE WITH ESOPHAGITIS, UNSPECIFIED WHETHER HEMORRHAGE: ICD-10-CM

## 2022-10-25 DIAGNOSIS — K21.9 GASTROESOPHAGEAL REFLUX DISEASE, UNSPECIFIED WHETHER ESOPHAGITIS PRESENT: ICD-10-CM

## 2022-10-25 DIAGNOSIS — G47.9 SLEEP DIFFICULTIES: ICD-10-CM

## 2022-10-25 DIAGNOSIS — R13.10 DYSPHAGIA, UNSPECIFIED TYPE: ICD-10-CM

## 2022-10-25 DIAGNOSIS — Z00.00 ANNUAL PHYSICAL EXAM: Primary | ICD-10-CM

## 2022-10-25 DIAGNOSIS — Z12.31 ENCOUNTER FOR SCREENING MAMMOGRAM FOR MALIGNANT NEOPLASM OF BREAST: ICD-10-CM

## 2022-10-25 LAB
T4 FREE SERPL-MCNC: 1.02 NG/DL (ref 0.71–1.51)
TSH SERPL DL<=0.005 MIU/L-ACNC: 2.08 UIU/ML (ref 0.4–4)

## 2022-10-25 PROCEDURE — 3074F PR MOST RECENT SYSTOLIC BLOOD PRESSURE < 130 MM HG: ICD-10-PCS | Mod: CPTII,S$GLB,, | Performed by: INTERNAL MEDICINE

## 2022-10-25 PROCEDURE — 1159F PR MEDICATION LIST DOCUMENTED IN MEDICAL RECORD: ICD-10-PCS | Mod: CPTII,S$GLB,, | Performed by: INTERNAL MEDICINE

## 2022-10-25 PROCEDURE — 3044F HG A1C LEVEL LT 7.0%: CPT | Mod: CPTII,S$GLB,, | Performed by: INTERNAL MEDICINE

## 2022-10-25 PROCEDURE — 3044F PR MOST RECENT HEMOGLOBIN A1C LEVEL <7.0%: ICD-10-PCS | Mod: CPTII,S$GLB,, | Performed by: INTERNAL MEDICINE

## 2022-10-25 PROCEDURE — 3078F PR MOST RECENT DIASTOLIC BLOOD PRESSURE < 80 MM HG: ICD-10-PCS | Mod: CPTII,S$GLB,, | Performed by: INTERNAL MEDICINE

## 2022-10-25 PROCEDURE — 99999 PR PBB SHADOW E&M-EST. PATIENT-LVL V: ICD-10-PCS | Mod: PBBFAC,,, | Performed by: INTERNAL MEDICINE

## 2022-10-25 PROCEDURE — 99999 PR PBB SHADOW E&M-EST. PATIENT-LVL V: CPT | Mod: PBBFAC,,, | Performed by: INTERNAL MEDICINE

## 2022-10-25 PROCEDURE — 84439 ASSAY OF FREE THYROXINE: CPT | Performed by: INTERNAL MEDICINE

## 2022-10-25 PROCEDURE — 3078F DIAST BP <80 MM HG: CPT | Mod: CPTII,S$GLB,, | Performed by: INTERNAL MEDICINE

## 2022-10-25 PROCEDURE — 1159F MED LIST DOCD IN RCRD: CPT | Mod: CPTII,S$GLB,, | Performed by: INTERNAL MEDICINE

## 2022-10-25 PROCEDURE — 3074F SYST BP LT 130 MM HG: CPT | Mod: CPTII,S$GLB,, | Performed by: INTERNAL MEDICINE

## 2022-10-25 PROCEDURE — 99213 OFFICE O/P EST LOW 20 MIN: CPT | Mod: 25,S$GLB,, | Performed by: INTERNAL MEDICINE

## 2022-10-25 PROCEDURE — 36415 COLL VENOUS BLD VENIPUNCTURE: CPT | Performed by: INTERNAL MEDICINE

## 2022-10-25 PROCEDURE — 99213 PR OFFICE/OUTPT VISIT, EST, LEVL III, 20-29 MIN: ICD-10-PCS | Mod: 25,S$GLB,, | Performed by: INTERNAL MEDICINE

## 2022-10-25 PROCEDURE — 86677 HELICOBACTER PYLORI ANTIBODY: CPT | Performed by: INTERNAL MEDICINE

## 2022-10-25 PROCEDURE — 84443 ASSAY THYROID STIM HORMONE: CPT | Performed by: INTERNAL MEDICINE

## 2022-10-25 RX ORDER — LEVOTHYROXINE SODIUM 50 UG/1
50 TABLET ORAL DAILY
Qty: 90 TABLET | Refills: 3 | Status: SHIPPED | OUTPATIENT
Start: 2022-10-25 | End: 2024-01-09

## 2022-10-25 RX ORDER — OMEPRAZOLE 20 MG/1
20 CAPSULE, DELAYED RELEASE ORAL DAILY
Qty: 90 CAPSULE | Refills: 3 | Status: CANCELLED | OUTPATIENT
Start: 2022-10-25 | End: 2022-11-24

## 2022-10-25 RX ORDER — OMEPRAZOLE 20 MG/1
20 CAPSULE, DELAYED RELEASE ORAL DAILY
Qty: 30 CAPSULE | Refills: 0 | Status: SHIPPED | OUTPATIENT
Start: 2022-10-25 | End: 2022-11-17

## 2022-10-25 RX ORDER — OMEGA-3 FATTY ACIDS 1000 MG
1 CAPSULE ORAL 2 TIMES DAILY
Qty: 18 CAPSULE | Refills: 3
Start: 2022-10-25 | End: 2023-01-10

## 2022-10-25 RX ORDER — FINASTERIDE 5 MG/1
5 TABLET, FILM COATED ORAL DAILY
COMMUNITY
Start: 2022-09-24

## 2022-10-25 NOTE — PROGRESS NOTES
"Subjective:       Patient ID: Ivonne Arevalo is a 57 y.o. female.    Chief Complaint: Annual Exam     Ivonne Arevalo is a 57 y.o.  female who presents for Annual Exam  .  C/o "constant acid reflux"  described burning sensation in throat at base of neck, globus sensation without odynophagia.  Started a few months ago an dworsened over the past few weeks. Has symptoms after each meal regardless of food type or amount.  Occurs with soups as well.  No cough, regurgiation but near misses if lies down soon after eating. Worse with carbonated liquids. Taking tums with some relief.  No nsaids, no hx of h pylori.     + fatigue, since having covid in July. No sob. Denies snoring but  notes her take strange breaths several times a night, her fit bit shows she awakened 4 times last night, she recalls only once. She has gained wt over last year.    The 10-year ASCVD risk score (Grant MARK, et al., 2019) is: 3.1%    Values used to calculate the score:      Age: 57 years      Sex: Female      Is Non- : No      Diabetic: No      Tobacco smoker: No      Systolic Blood Pressure: 118 mmHg      Is BP treated: No      HDL Cholesterol: 39 mg/dL      Total Cholesterol: 225 mg/dL      Thyroid Problem  Presents for follow-up visit. Symptoms include cold intolerance, fatigue, hair loss and weight gain. Patient reports no depressed mood, dry skin, palpitations or tremors. The symptoms have been worsening.   Patient Active Problem List   Diagnosis    Mild hyperlipidemia    Stress incontinence    Basal cell cancer    Family history of early CAD    Family history of diabetes mellitus (DM)    Hypertriglyceridemia    Hypothyroid    Herpes zoster of eyelid    Gastroesophageal reflux disease with esophagitis    Atypical chest pain    Thrombocytosis    Squamous cell carcinoma of arm    Polyp of colon    Chronic cough           Past Medical History:   Diagnosis Date    Basal cell cancer     followed by Dr Lex Zhu " history of diabetes mellitus (DM)     Family history of early CAD     Mild hyperlipidemia     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 Burnett (excision)    Stress incontinence        Past Surgical History:   Procedure Laterality Date    BONE MARROW BIOPSY Left 6/28/2018    Procedure: Biopsy-bone marrow;  Surgeon: Saul Freitas MD;  Location: [facility] OR [address] [place] [address];  Service: Oncology;  Laterality: Left;    CERVICAL DISC ARTHROPLASTY  2008    ENDOMETRIAL ABLATION      hammertoe surgery  4/26/15    right    TOE SURGERY Right     TOTAL SHOULDER ARTHROPLASTY  2008       Family History   Problem Relation Age of Onset    Depression Mother     Hypertension Mother     Heart disease Mother     Stroke Mother     Alzheimer's disease Mother     Diabetes Father     Heart disease Father     Hypertension Father     Kidney disease Father     Cataracts Father     Cirrhosis Father     Hyperlipidemia Sister     Arthritis Brother     Coronary artery disease Brother 42             Hypertension Brother     Heart attack Brother     Heart disease Brother     Heart disease Maternal Aunt     Hypertension Maternal Aunt     Cancer Maternal Uncle         unknown primary    Heart disease Maternal Uncle     Hypertension Maternal Uncle     Arthritis Maternal Grandmother     Diabetes Maternal Grandmother     Heart disease Maternal Grandmother     Hypertension Maternal Grandmother     Stroke Sister 50    Coronary artery disease Sister 56    Hyperlipidemia Sister     Diabetes Paternal Grandmother     Heart disease Paternal Grandmother     Amblyopia Neg Hx     Blindness Neg Hx     Glaucoma Neg Hx     Macular degeneration Neg Hx     Strabismus Neg Hx     Retinal detachment Neg Hx        Social History     Tobacco Use    Smoking status: Never    Smokeless tobacco: Never   Substance Use Topics    Alcohol use: Yes     Alcohol/week: 2.0 standard drinks     Types: 2 Glasses of wine per week    Drug use: No         Review of  "Systems   Constitutional:  Positive for fatigue and weight gain.   Cardiovascular:  Negative for palpitations.   Endocrine: Positive for cold intolerance.   Neurological:  Negative for tremors.       Objective:   Blood pressure 118/78, pulse 82, height 5' 5" (1.651 m), weight 76 kg (167 lb 8.8 oz), SpO2 98 %.     Physical Exam  Constitutional:       General: She is not in acute distress.     Appearance: She is well-developed.   HENT:      Head: Normocephalic and atraumatic.      Right Ear: Tympanic membrane and external ear normal.      Left Ear: Tympanic membrane and external ear normal.      Mouth/Throat:      Mouth: Mucous membranes are moist.      Pharynx: Oropharynx is clear.   Eyes:      General:         Right eye: No discharge.         Left eye: No discharge.      Conjunctiva/sclera: Conjunctivae normal.   Neck:      Thyroid: No thyromegaly.      Vascular: No carotid bruit.   Cardiovascular:      Heart sounds: Normal heart sounds. No murmur heard.    No friction rub. No gallop.   Pulmonary:      Effort: Pulmonary effort is normal.      Breath sounds: Normal breath sounds. No wheezing or rales.   Abdominal:      General: Bowel sounds are normal. There is no distension.      Palpations: Abdomen is soft.      Tenderness: There is no abdominal tenderness.   Musculoskeletal:         General: No tenderness.      Right lower leg: No edema.      Left lower leg: No edema.   Lymphadenopathy:      Cervical: No cervical adenopathy.   Skin:     General: Skin is warm and dry.   Neurological:      Mental Status: She is alert and oriented to person, place, and time. Mental status is at baseline.   Psychiatric:         Thought Content: Thought content normal.         Judgment: Judgment normal.       Prior labs reviewed    Health Maintenance         Date Due Completion Date    Cervical Cancer Screening 04/26/2019 4/26/2016    Override on 11/14/2013: Done    COVID-19 Vaccine (3 - Booster for Pfizer series) 09/16/2021 7/22/2021 "    Influenza Vaccine (1) 09/01/2022 12/3/2020    Mammogram 11/10/2022 11/10/2021    Override on 12/18/2017: Done (done at [facility])    Override on 12/14/2016: Done (at [facility])    Override on 11/11/2015: Done    Override on 10/13/2014: Done    Override on 11/14/2013: Done    Pneumococcal Vaccines (Age 0-64) (1 - PCV) 01/31/2030 (Originally 1/31/1971) ---    Colorectal Cancer Screening 08/05/2026 8/5/2021    Override on 4/21/2016: Declined (scheduled may 3rd with dr salgado)    Lipid Panel 10/21/2027 10/21/2022    TETANUS VACCINE 07/15/2029 7/15/2019            Assessment/Plan:       1. Annual physical exam  -     Ambulatory referral/consult to Obstetrics / Gynecology; Future; Expected date: 11/01/2022  Recommend daily sunscreen, cardiovascular exercise min 30 min 5 days per week. Seatbelts routinely.    Recommend monthly self breast exams and annual mammogram OVER AGE 40 or as recommended.  Also screening  pap with reflex HPV q 3 years or as recommended by gyn provider.    2. Gastroesophageal reflux disease, unspecified whether esophagitis present  -     H. pylori Antibody, IgG; Future; Expected date: 10/25/2022  -     omeprazole (PRILOSEC) 20 MG capsule; Take 1 capsule (20 mg total) by mouth once daily.  Dispense: 30 capsule; Refill: 0  Follow up on rtc, if neg and improved switch to pepcid ac  Lifestyle modifications    3. Hypothyroidism, unspecified type  -     levothyroxine (SYNTHROID) 50 MCG tablet; Take 1 tablet (50 mcg total) by mouth once daily.  Dispense: 90 tablet; Refill: 3  -     TSH; Future; Expected date: 10/25/2022  -     T4, Free; Future; Expected date: 10/25/2022  Mild symptoms, check labs    4. Thrombocytosis  Comments:  followed by dr herring  Overview:  Followed by rian sweet  Takes one 81mg asa daily     Assessment & Plan:  Recommend changing to enteric coated asa 81 mg daily       5. Dysphagia, unspecified type  -     FL Esophagram Complete; Future; Expected date: 10/25/2022  -     H.  pylori Antibody, IgG; Future; Expected date: 10/25/2022    6. Encounter for screening mammogram for malignant neoplasm of breast  -     Mammo Digital Screening Bilat w/ Dudley; Future; Expected date: 10/25/2022    7. Sleep difficulties  Comments:  avoid napping, sleep on side  see sleep med for aakash eval    Orders:  -     Ambulatory referral/consult to Sleep Disorders; Future; Expected date: 11/01/2022    8. Gastroesophageal reflux disease with esophagitis, unspecified whether hemorrhage  Comments:  if all neg, barium swallow neg refer to gi  Overview:  Dx 2017, occasional      Assessment & Plan:  Check h pylori  Omeprazole for 30 days  If improved wean off or switch to pepcid ac 20-40 mg daily        9. Hypertriglyceridemia  See below   10. Mild hyperlipidemia  Comments:  now marcello low hdl, increase exercise add 1g omega three bid  see cards due to family hx of early CAD    11. Family history of early CAD  -     Ambulatory referral/consult to Cardiology; Future; Expected date: 11/01/2022    Other orders  -     omega-3 fatty acids 1,000 mg Cap; Take 1 capsule (1,000 mg total) by mouth 2 (two) times a day.  Dispense: 18 capsule; Refill: 3        Medication List with Changes/Refills   New Medications    OMEGA-3 FATTY ACIDS 1,000 MG CAP    Take 1 capsule (1,000 mg total) by mouth 2 (two) times a day.   Current Medications    FENOFIBRATE MICRONIZED (LOFIBRA) 200 MG CAP    TAKE 1 CAPSULE (200 MG TOTAL) BY MOUTH DAILY WITH BREAKFAST.    FINASTERIDE (PROSCAR) 5 MG TABLET    Take 5 mg by mouth once daily.    MONTELUKAST (SINGULAIR) 10 MG TABLET    TAKE 1 TABLET BY MOUTH EVERY DAY IN THE EVENING    PULSE OXIMETER (PULSE OXIMETER) DEVICE    by Apply Externally route 2 (two) times a day. Use twice daily at 8 AM and 3 PM and record the value in [employer] as directed.    SPIRONOLACTONE (ALDACTONE) 100 MG TABLET    Take 200 mg by mouth once daily.   Changed and/or Refilled Medications    Modified Medication Previous Medication     LEVOTHYROXINE (SYNTHROID) 50 MCG TABLET levothyroxine (SYNTHROID) 50 MCG tablet       Take 1 tablet (50 mcg total) by mouth once daily.    Take 1 tablet (50 mcg total) by mouth once daily.    OMEPRAZOLE (PRILOSEC) 20 MG CAPSULE omeprazole (PRILOSEC) 20 MG capsule       Take 1 capsule (20 mg total) by mouth once daily.    Take 1 capsule (20 mg total) by mouth once daily.   Discontinued Medications    BENZONATATE (TESSALON) 200 MG CAPSULE    TAKE 1 CAPSULE BY MOUTH EVERY DAY 3 TIMES A DAY AS NEEDED FOR COUGH    FINASTERIDE (PROPECIA) 1 MG TABLET    Take 5 mg by mouth once daily.     HYDROCODONE-ACETAMINOPHEN (NORCO) 7.5-325 MG PER TABLET    Take 1 tablet by mouth every 4 to 6 hours as needed.    VENTOLIN HFA 90 MCG/ACTUATION INHALER    INHALE 2 PUFFS INTO THE LUNGS EVERY 6 (SIX) HOURS AS NEEDED FOR WHEEZING. RESCUE       Recommend patient complete vaccinations as listed in the overdue health maintenance report. Pt may obtain vaccinations at their local pharmacy, any [facility] pharmacy or request vaccination in our clinic.  Please note that some insurances will only cover vaccination cost at specific locations.Please check with your insurance provider regarding your coverage.  Vaccines that are not covered are still recommended.

## 2022-10-25 NOTE — ASSESSMENT & PLAN NOTE
Check h pylori  Omeprazole for 30 days  If improved wean off or switch to pepcid ac 20-40 mg daily

## 2022-10-26 ENCOUNTER — HOSPITAL ENCOUNTER (OUTPATIENT)
Dept: RADIOLOGY | Facility: HOSPITAL | Age: 57
Discharge: HOME | End: 2022-10-26
Attending: INTERNAL MEDICINE
Payer: COMMERCIAL

## 2022-10-26 DIAGNOSIS — R13.10 DYSPHAGIA, UNSPECIFIED TYPE: ICD-10-CM

## 2022-10-26 PROCEDURE — 74220 X-RAY XM ESOPHAGUS 1CNTRST: CPT | Mod: 26,,, | Performed by: RADIOLOGY

## 2022-10-26 PROCEDURE — 25500020 PHARM REV CODE 255: Performed by: INTERNAL MEDICINE

## 2022-10-26 PROCEDURE — 74220 FL ESOPHAGRAM COMPLETE: ICD-10-PCS | Mod: 26,,, | Performed by: RADIOLOGY

## 2022-10-26 PROCEDURE — 74220 X-RAY XM ESOPHAGUS 1CNTRST: CPT | Mod: TC

## 2022-10-26 PROCEDURE — A9698 NON-RAD CONTRAST MATERIALNOC: HCPCS | Performed by: INTERNAL MEDICINE

## 2022-10-26 RX ADMIN — BARIUM SULFATE 355 ML: 0.6 SUSPENSION ORAL at 09:10

## 2022-10-27 ENCOUNTER — TELEPHONE (OUTPATIENT)
Dept: INTERNAL MEDICINE | Facility: CLINIC | Age: 57
End: 2022-10-27
Payer: COMMERCIAL

## 2022-10-27 DIAGNOSIS — K22.4 ESOPHAGEAL DYSMOTILITY: Primary | ICD-10-CM

## 2022-10-27 NOTE — TELEPHONE ENCOUNTER
Sent Fluency message advising pt; someone had already called and scheduled gastro appointment for pt.

## 2022-10-27 NOTE — TELEPHONE ENCOUNTER
Please let pt know her barium swallow shows some dysfunction in the muscles in the esophagus.  I would like her to see a gastroenterologist regarding next steps.

## 2022-10-28 ENCOUNTER — OFFICE VISIT (OUTPATIENT)
Dept: CARDIOLOGY | Facility: CLINIC | Age: 57
End: 2022-10-28
Payer: COMMERCIAL

## 2022-10-28 VITALS
BODY MASS INDEX: 27.23 KG/M2 | HEIGHT: 66 IN | HEART RATE: 81 BPM | WEIGHT: 169.44 LBS | DIASTOLIC BLOOD PRESSURE: 80 MMHG | SYSTOLIC BLOOD PRESSURE: 130 MMHG

## 2022-10-28 DIAGNOSIS — Z82.49 FAMILY HISTORY OF EARLY CAD: Primary | ICD-10-CM

## 2022-10-28 DIAGNOSIS — E78.5 MILD HYPERLIPIDEMIA: ICD-10-CM

## 2022-10-28 LAB — H PYLORI IGG SERPL QL IA: NORMAL

## 2022-10-28 PROCEDURE — 3075F PR MOST RECENT SYSTOLIC BLOOD PRESS GE 130-139MM HG: ICD-10-PCS | Mod: CPTII,S$GLB,, | Performed by: INTERNAL MEDICINE

## 2022-10-28 PROCEDURE — 93000 ELECTROCARDIOGRAM COMPLETE: CPT | Mod: S$GLB,,, | Performed by: INTERNAL MEDICINE

## 2022-10-28 PROCEDURE — 1159F PR MEDICATION LIST DOCUMENTED IN MEDICAL RECORD: ICD-10-PCS | Mod: CPTII,S$GLB,, | Performed by: INTERNAL MEDICINE

## 2022-10-28 PROCEDURE — 99999 PR PBB SHADOW E&M-EST. PATIENT-LVL IV: CPT | Mod: PBBFAC,,, | Performed by: INTERNAL MEDICINE

## 2022-10-28 PROCEDURE — 3075F SYST BP GE 130 - 139MM HG: CPT | Mod: CPTII,S$GLB,, | Performed by: INTERNAL MEDICINE

## 2022-10-28 PROCEDURE — 1160F PR REVIEW ALL MEDS BY PRESCRIBER/CLIN PHARMACIST DOCUMENTED: ICD-10-PCS | Mod: CPTII,S$GLB,, | Performed by: INTERNAL MEDICINE

## 2022-10-28 PROCEDURE — 1159F MED LIST DOCD IN RCRD: CPT | Mod: CPTII,S$GLB,, | Performed by: INTERNAL MEDICINE

## 2022-10-28 PROCEDURE — 3079F PR MOST RECENT DIASTOLIC BLOOD PRESSURE 80-89 MM HG: ICD-10-PCS | Mod: CPTII,S$GLB,, | Performed by: INTERNAL MEDICINE

## 2022-10-28 PROCEDURE — 1160F RVW MEDS BY RX/DR IN RCRD: CPT | Mod: CPTII,S$GLB,, | Performed by: INTERNAL MEDICINE

## 2022-10-28 PROCEDURE — 99204 OFFICE O/P NEW MOD 45 MIN: CPT | Mod: S$GLB,,, | Performed by: INTERNAL MEDICINE

## 2022-10-28 PROCEDURE — 99999 PR PBB SHADOW E&M-EST. PATIENT-LVL IV: ICD-10-PCS | Mod: PBBFAC,,, | Performed by: INTERNAL MEDICINE

## 2022-10-28 PROCEDURE — 3044F PR MOST RECENT HEMOGLOBIN A1C LEVEL <7.0%: ICD-10-PCS | Mod: CPTII,S$GLB,, | Performed by: INTERNAL MEDICINE

## 2022-10-28 PROCEDURE — 3044F HG A1C LEVEL LT 7.0%: CPT | Mod: CPTII,S$GLB,, | Performed by: INTERNAL MEDICINE

## 2022-10-28 PROCEDURE — 3079F DIAST BP 80-89 MM HG: CPT | Mod: CPTII,S$GLB,, | Performed by: INTERNAL MEDICINE

## 2022-10-28 PROCEDURE — 93000 EKG 12-LEAD: ICD-10-PCS | Mod: S$GLB,,, | Performed by: INTERNAL MEDICINE

## 2022-10-28 PROCEDURE — 99204 PR OFFICE/OUTPT VISIT, NEW, LEVL IV, 45-59 MIN: ICD-10-PCS | Mod: S$GLB,,, | Performed by: INTERNAL MEDICINE

## 2022-10-28 RX ORDER — ROSUVASTATIN CALCIUM 5 MG/1
5 TABLET, COATED ORAL DAILY
Qty: 90 TABLET | Refills: 3 | Status: SHIPPED | OUTPATIENT
Start: 2022-10-28 | End: 2022-11-29

## 2022-10-28 NOTE — PROGRESS NOTES
HISTORY:    56 yo F with a h/o hyperlipidemia, premature FH, thrombocytosis with unremarkable mccrary, and hypothyroidism presenting for initial evaluation by me.    The patient denies any symptoms of chest pain, shortness of breath, or dyspnea on exertion. Active in her life averaging over 10k steps/day.     The patient denies any previous history of myocardial infarction, coronary artery disease, peripheral arterial disease, stroke, congestive heart failure, or cardiomyopathy.    Brother had an MI in his 40s and sister had a CVA in her 50s. Non-smoker.    Tolerates asa 81x1 and fenofibrate/omega 3 fatty acids.      MEDICATIONS:      Current Outpatient Medications:     finasteride (PROSCAR) 5 mg tablet, Take 5 mg by mouth once daily., Disp: , Rfl:     levothyroxine (SYNTHROID) 50 MCG tablet, Take 1 tablet (50 mcg total) by mouth once daily., Disp: 90 tablet, Rfl: 3    montelukast (SINGULAIR) 10 mg tablet, TAKE 1 TABLET BY MOUTH EVERY DAY IN THE EVENING, Disp: 90 tablet, Rfl: 3    omega-3 fatty acids 1,000 mg Cap, Take 1 capsule (1,000 mg total) by mouth 2 (two) times a day., Disp: 18 capsule, Rfl: 3    omeprazole (PRILOSEC) 20 MG capsule, Take 1 capsule (20 mg total) by mouth once daily., Disp: 30 capsule, Rfl: 0    pulse oximeter (PULSE OXIMETER) device, by Apply Externally route 2 (two) times a day. Use twice daily at 8 AM and 3 PM and record the value in [employer] as directed., Disp: 1 each, Rfl: 0    spironolactone (ALDACTONE) 100 MG tablet, Take 200 mg by mouth once daily., Disp: , Rfl: 6    rosuvastatin (CRESTOR) 5 MG tablet, Take 1 tablet (5 mg total) by mouth once daily., Disp: 90 tablet, Rfl: 3      PHYSICAL EXAM:    Vitals:    10/28/22 0858   BP: 130/80   Pulse: 81       NAD, A+Ox3.  No jvd, no bruit.  RRR nml s1,s2. No murmurs.  CTA B no wheezes or crackles.  2+ B radial and 1+ B DP/PT. No edema.    LABS/STUDIES (imaging reviewed during clinic visit):    October 2022 hemoglobin 13.9.  Creatinine 0.9 with BUN  of 16.  Normal TSH and A1c.   and HDL 39. Tgs 183.  ECG today NSR no Qs/STs  SAVANAH 2017 7.6 minutes on a high ramp protocol with no ischemia.  Normal TTE at baseline and with stress.      ASSESSMENT & PLAN:    1. Family history of early CAD    2. Mild hyperlipidemia        Orders Placed This Encounter    IN OFFICE EKG 12-LEAD (to Muse)    rosuvastatin (CRESTOR) 5 MG tablet        Hyperlipidemia with a +FH. Will stop fenofibrate in favor of statin therapy. +/- on Omega 3s. If taking, 2gm BID. Okay to continue. Will start rosuvastatin 5x1 with goal of uptitrating to max tolerated dose. Diet modifications and exercise discussed. Actually doing fairly well from an exercise standpoint. Bps controlled. On spironolactone for acne and finasteride for hair loss.         Myra Begum MD

## 2022-11-09 ENCOUNTER — PATIENT MESSAGE (OUTPATIENT)
Dept: INTERNAL MEDICINE | Facility: CLINIC | Age: 57
End: 2022-11-09
Payer: COMMERCIAL

## 2022-11-17 DIAGNOSIS — K21.9 GASTROESOPHAGEAL REFLUX DISEASE, UNSPECIFIED WHETHER ESOPHAGITIS PRESENT: ICD-10-CM

## 2022-11-17 RX ORDER — OMEPRAZOLE 20 MG/1
CAPSULE, DELAYED RELEASE ORAL
Qty: 90 CAPSULE | Refills: 3 | Status: SHIPPED | OUTPATIENT
Start: 2022-11-17 | End: 2023-11-15 | Stop reason: SDUPTHER

## 2022-11-17 NOTE — TELEPHONE ENCOUNTER
Refill Decision Note   Ivonne Arevalo  is requesting a refill authorization.  Brief Assessment and Rationale for Refill:  Approve     Medication Therapy Plan:       Medication Reconciliation Completed: No   Comments:     No Care Gaps recommended.     Note composed:9:19 AM 11/17/2022

## 2022-11-17 NOTE — TELEPHONE ENCOUNTER
No new care gaps identified.  [facility] Embedded Care Gaps. Reference number: [id number]. 11/17/2022   7:46:10 AM CST

## 2022-11-29 ENCOUNTER — PATIENT MESSAGE (OUTPATIENT)
Dept: CARDIOLOGY | Facility: CLINIC | Age: 57
End: 2022-11-29
Payer: COMMERCIAL

## 2022-11-29 RX ORDER — SIMVASTATIN 10 MG/1
10 TABLET, FILM COATED ORAL NIGHTLY
Qty: 30 TABLET | Refills: 11 | Status: SHIPPED | OUTPATIENT
Start: 2022-11-29 | End: 2023-02-08

## 2022-12-13 ENCOUNTER — PATIENT MESSAGE (OUTPATIENT)
Dept: HEMATOLOGY/ONCOLOGY | Facility: CLINIC | Age: 57
End: 2022-12-13
Payer: COMMERCIAL

## 2022-12-29 ENCOUNTER — HOSPITAL ENCOUNTER (OUTPATIENT)
Dept: RADIOLOGY | Facility: OTHER | Age: 57
Discharge: HOME | End: 2022-12-29
Attending: INTERNAL MEDICINE
Payer: COMMERCIAL

## 2022-12-29 DIAGNOSIS — Z12.31 ENCOUNTER FOR SCREENING MAMMOGRAM FOR MALIGNANT NEOPLASM OF BREAST: ICD-10-CM

## 2022-12-29 PROCEDURE — 77063 BREAST TOMOSYNTHESIS BI: CPT | Mod: TC

## 2022-12-29 PROCEDURE — 77067 SCR MAMMO BI INCL CAD: CPT | Mod: TC

## 2022-12-29 PROCEDURE — 77063 BREAST TOMOSYNTHESIS BI: CPT | Mod: 26,,, | Performed by: RADIOLOGY

## 2022-12-29 PROCEDURE — 77067 SCR MAMMO BI INCL CAD: CPT | Mod: 26,,, | Performed by: RADIOLOGY

## 2022-12-29 PROCEDURE — 77067 MAMMO DIGITAL SCREENING BILAT WITH TOMO: ICD-10-PCS | Mod: 26,,, | Performed by: RADIOLOGY

## 2022-12-29 PROCEDURE — 77063 MAMMO DIGITAL SCREENING BILAT WITH TOMO: ICD-10-PCS | Mod: 26,,, | Performed by: RADIOLOGY

## 2023-01-10 ENCOUNTER — OFFICE VISIT (OUTPATIENT)
Dept: GASTROENTEROLOGY | Facility: CLINIC | Age: 58
End: 2023-01-10
Payer: COMMERCIAL

## 2023-01-10 VITALS
HEIGHT: 65 IN | BODY MASS INDEX: 28.98 KG/M2 | HEART RATE: 81 BPM | DIASTOLIC BLOOD PRESSURE: 81 MMHG | WEIGHT: 173.94 LBS | SYSTOLIC BLOOD PRESSURE: 123 MMHG

## 2023-01-10 DIAGNOSIS — K21.9 GASTROESOPHAGEAL REFLUX DISEASE, UNSPECIFIED WHETHER ESOPHAGITIS PRESENT: Primary | ICD-10-CM

## 2023-01-10 DIAGNOSIS — K22.4 ESOPHAGEAL DYSMOTILITY: ICD-10-CM

## 2023-01-10 PROCEDURE — 1159F PR MEDICATION LIST DOCUMENTED IN MEDICAL RECORD: ICD-10-PCS | Mod: CPTII,S$GLB,, | Performed by: INTERNAL MEDICINE

## 2023-01-10 PROCEDURE — 3074F PR MOST RECENT SYSTOLIC BLOOD PRESSURE < 130 MM HG: ICD-10-PCS | Mod: CPTII,S$GLB,, | Performed by: INTERNAL MEDICINE

## 2023-01-10 PROCEDURE — 3079F DIAST BP 80-89 MM HG: CPT | Mod: CPTII,S$GLB,, | Performed by: INTERNAL MEDICINE

## 2023-01-10 PROCEDURE — 3008F PR BODY MASS INDEX (BMI) DOCUMENTED: ICD-10-PCS | Mod: CPTII,S$GLB,, | Performed by: INTERNAL MEDICINE

## 2023-01-10 PROCEDURE — 99204 OFFICE O/P NEW MOD 45 MIN: CPT | Mod: S$GLB,,, | Performed by: INTERNAL MEDICINE

## 2023-01-10 PROCEDURE — 3008F BODY MASS INDEX DOCD: CPT | Mod: CPTII,S$GLB,, | Performed by: INTERNAL MEDICINE

## 2023-01-10 PROCEDURE — 99999 PR PBB SHADOW E&M-EST. PATIENT-LVL IV: CPT | Mod: PBBFAC,,, | Performed by: INTERNAL MEDICINE

## 2023-01-10 PROCEDURE — 3079F PR MOST RECENT DIASTOLIC BLOOD PRESSURE 80-89 MM HG: ICD-10-PCS | Mod: CPTII,S$GLB,, | Performed by: INTERNAL MEDICINE

## 2023-01-10 PROCEDURE — 99204 PR OFFICE/OUTPT VISIT, NEW, LEVL IV, 45-59 MIN: ICD-10-PCS | Mod: S$GLB,,, | Performed by: INTERNAL MEDICINE

## 2023-01-10 PROCEDURE — 99999 PR PBB SHADOW E&M-EST. PATIENT-LVL IV: ICD-10-PCS | Mod: PBBFAC,,, | Performed by: INTERNAL MEDICINE

## 2023-01-10 PROCEDURE — 1159F MED LIST DOCD IN RCRD: CPT | Mod: CPTII,S$GLB,, | Performed by: INTERNAL MEDICINE

## 2023-01-10 PROCEDURE — 3074F SYST BP LT 130 MM HG: CPT | Mod: CPTII,S$GLB,, | Performed by: INTERNAL MEDICINE

## 2023-01-10 NOTE — PROGRESS NOTES
"Reason for visit: GERD and Dysphagia    HPI:  is a 57 year old lady with GERD. Has been complaining of  "constant acid reflux"  described as burning sensation in throat at base of neck and a globus sensation. Has symptoms after each meal regardless of food type or amount. Denies any cough, acid regurgitation although feels it coming up if she lies down soon after a meal. Rarely has nocturnal reflux. Symptoms are worse with carbonated beverages. Taking TUMS with some relief.  Has gained weight over the last year.  Denies any odynophagia, nausea or vomiting. Has noted occasional dysphagia to solids especially chunky foods like pork chops. No abdominal pains, changes in bowel pattern, blood/ mucus in stool or unintentional weight loss. No melena or maroon stools. No recent changes in diet or medications. No family history of Celiac disease or GI malignancy. Sister and Father with Crohn's disease. No regular NSAIDs, alcohol or tobacco use. No recent antibiotic use, travels or sick contacts. No prior history of C.diff. Colonoscopy in 2021 with diverticulosis coli. Barium esophagram in 2022 revealed nonspecific esophageal motility disorder, a small hiatal hernia and a B ring. No prior abdominal surgeries.    Past medical, surgical, social and family history reviewed in epic    Medication allergies reviewed in epic    Review of systems:    Constitutional:  No fever, no chills, no weight loss, appetite is normal; complains of fatigue since COVID in July 2022.  Eyes:  No visual changes or red eyes  ENT:  No odynophagia or hoarseness of voice  Cardiovascular:  No angina or palpitation  Respiratory:  No shortness breath or wheezing  Genitourinary:  No dysuria or frequency  Musculoskeletal:  No myalgias or arthralgias  Skin:  No pruritus or eczema  Neurologic:  No headache or seizures  Psychiatric:  No anxiety depression  Gastrointestinal:  See HPI    Physical exam:  Vitals see epic, awake, alert, oriented x3 in no " acute distress    Neck:  Supple, no carotid bruit, no cervical adenopathy  Abdomen:  Obese soft, nontender, nondistended, no masses palpable, no hepatosplenomegaly detected, bowel sounds are normal, no ascites clinically detectable  Eyes:  Conjunctivae anicteric, not injected  ENT:  Oral mucosa moist  Cardiovascular:  S1, S2 normal, no murmurs, no gallops, no abdominal bruits heard  Respiratory:  Bilateral air entry equal, no rhonchi, no crackles, normal effort  Skin:  No palmar erythema or spider angiomata  Neurologic:  No asterixis or tremors  Psychiatric:  Affect appropriate, proper judgment, proper insight, oriented to place and time  Lower extremities:  No pedal edema    Recent labs, imaging and endoscopy reports reviewed.      Impression: GERD with occasional solid food dysphagia. Average risk for CRC (last colonoscopy in 2021)    Recommendations:     Schedule EGD  Omeprazole 20 mg, 30 minutes before breakfast

## 2023-02-03 DIAGNOSIS — D75.839 THROMBOCYTOSIS: Primary | ICD-10-CM

## 2023-02-06 ENCOUNTER — OFFICE VISIT (OUTPATIENT)
Dept: HEMATOLOGY/ONCOLOGY | Facility: CLINIC | Age: 58
End: 2023-02-06
Payer: COMMERCIAL

## 2023-02-06 ENCOUNTER — LAB VISIT (OUTPATIENT)
Dept: LAB | Facility: HOSPITAL | Age: 58
End: 2023-02-06
Attending: INTERNAL MEDICINE
Payer: COMMERCIAL

## 2023-02-06 VITALS
HEART RATE: 84 BPM | RESPIRATION RATE: 18 BRPM | OXYGEN SATURATION: 96 % | BODY MASS INDEX: 28.72 KG/M2 | HEIGHT: 65 IN | WEIGHT: 172.38 LBS | DIASTOLIC BLOOD PRESSURE: 75 MMHG | SYSTOLIC BLOOD PRESSURE: 138 MMHG | TEMPERATURE: 98 F

## 2023-02-06 DIAGNOSIS — D75.839 THROMBOCYTOSIS: ICD-10-CM

## 2023-02-06 DIAGNOSIS — D75.839 THROMBOCYTOSIS: Primary | ICD-10-CM

## 2023-02-06 DIAGNOSIS — E03.9 HYPOTHYROIDISM, UNSPECIFIED TYPE: ICD-10-CM

## 2023-02-06 LAB
BASOPHILS # BLD AUTO: 0.06 K/UL (ref 0–0.2)
BASOPHILS NFR BLD: 0.7 % (ref 0–1.9)
CRP SERPL-MCNC: 13.9 MG/L (ref 0–8.2)
DIFFERENTIAL METHOD: ABNORMAL
EOSINOPHIL # BLD AUTO: 0.1 K/UL (ref 0–0.5)
EOSINOPHIL NFR BLD: 0.9 % (ref 0–8)
ERYTHROCYTE [DISTWIDTH] IN BLOOD BY AUTOMATED COUNT: 11.2 % (ref 11.5–14.5)
ERYTHROCYTE [SEDIMENTATION RATE] IN BLOOD BY PHOTOMETRIC METHOD: 31 MM/HR (ref 0–36)
FERRITIN SERPL-MCNC: 358 NG/ML (ref 20–300)
HCT VFR BLD AUTO: 43.4 % (ref 37–48.5)
HGB BLD-MCNC: 14.9 G/DL (ref 12–16)
IMM GRANULOCYTES # BLD AUTO: 0.04 K/UL (ref 0–0.04)
IMM GRANULOCYTES NFR BLD AUTO: 0.5 % (ref 0–0.5)
IRON SERPL-MCNC: 118 UG/DL (ref 30–160)
LYMPHOCYTES # BLD AUTO: 2.1 K/UL (ref 1–4.8)
LYMPHOCYTES NFR BLD: 23.8 % (ref 18–48)
MCH RBC QN AUTO: 32.3 PG (ref 27–31)
MCHC RBC AUTO-ENTMCNC: 34.3 G/DL (ref 32–36)
MCV RBC AUTO: 94 FL (ref 82–98)
MONOCYTES # BLD AUTO: 0.7 K/UL (ref 0.3–1)
MONOCYTES NFR BLD: 8 % (ref 4–15)
NEUTROPHILS # BLD AUTO: 5.8 K/UL (ref 1.8–7.7)
NEUTROPHILS NFR BLD: 66.1 % (ref 38–73)
NRBC BLD-RTO: 0 /100 WBC
PLATELET # BLD AUTO: 411 K/UL (ref 150–450)
PMV BLD AUTO: 9.4 FL (ref 9.2–12.9)
RBC # BLD AUTO: 4.61 M/UL (ref 4–5.4)
SATURATED IRON: 33 % (ref 20–50)
TOTAL IRON BINDING CAPACITY: 355 UG/DL (ref 250–450)
TRANSFERRIN SERPL-MCNC: 240 MG/DL (ref 200–375)
WBC # BLD AUTO: 8.83 K/UL (ref 3.9–12.7)

## 2023-02-06 PROCEDURE — 3075F PR MOST RECENT SYSTOLIC BLOOD PRESS GE 130-139MM HG: ICD-10-PCS | Mod: CPTII,S$GLB,, | Performed by: INTERNAL MEDICINE

## 2023-02-06 PROCEDURE — 82728 ASSAY OF FERRITIN: CPT | Performed by: INTERNAL MEDICINE

## 2023-02-06 PROCEDURE — 1160F RVW MEDS BY RX/DR IN RCRD: CPT | Mod: CPTII,S$GLB,, | Performed by: INTERNAL MEDICINE

## 2023-02-06 PROCEDURE — 1159F PR MEDICATION LIST DOCUMENTED IN MEDICAL RECORD: ICD-10-PCS | Mod: CPTII,S$GLB,, | Performed by: INTERNAL MEDICINE

## 2023-02-06 PROCEDURE — 85652 RBC SED RATE AUTOMATED: CPT | Performed by: INTERNAL MEDICINE

## 2023-02-06 PROCEDURE — 85025 COMPLETE CBC W/AUTO DIFF WBC: CPT | Performed by: INTERNAL MEDICINE

## 2023-02-06 PROCEDURE — 99999 PR PBB SHADOW E&M-EST. PATIENT-LVL IV: CPT | Mod: PBBFAC,,, | Performed by: INTERNAL MEDICINE

## 2023-02-06 PROCEDURE — 3075F SYST BP GE 130 - 139MM HG: CPT | Mod: CPTII,S$GLB,, | Performed by: INTERNAL MEDICINE

## 2023-02-06 PROCEDURE — 99214 OFFICE O/P EST MOD 30 MIN: CPT | Mod: S$GLB,,, | Performed by: INTERNAL MEDICINE

## 2023-02-06 PROCEDURE — 3078F DIAST BP <80 MM HG: CPT | Mod: CPTII,S$GLB,, | Performed by: INTERNAL MEDICINE

## 2023-02-06 PROCEDURE — 99999 PR PBB SHADOW E&M-EST. PATIENT-LVL IV: ICD-10-PCS | Mod: PBBFAC,,, | Performed by: INTERNAL MEDICINE

## 2023-02-06 PROCEDURE — 99214 PR OFFICE/OUTPT VISIT, EST, LEVL IV, 30-39 MIN: ICD-10-PCS | Mod: S$GLB,,, | Performed by: INTERNAL MEDICINE

## 2023-02-06 PROCEDURE — 84466 ASSAY OF TRANSFERRIN: CPT | Performed by: INTERNAL MEDICINE

## 2023-02-06 PROCEDURE — 1160F PR REVIEW ALL MEDS BY PRESCRIBER/CLIN PHARMACIST DOCUMENTED: ICD-10-PCS | Mod: CPTII,S$GLB,, | Performed by: INTERNAL MEDICINE

## 2023-02-06 PROCEDURE — 3008F BODY MASS INDEX DOCD: CPT | Mod: CPTII,S$GLB,, | Performed by: INTERNAL MEDICINE

## 2023-02-06 PROCEDURE — 3078F PR MOST RECENT DIASTOLIC BLOOD PRESSURE < 80 MM HG: ICD-10-PCS | Mod: CPTII,S$GLB,, | Performed by: INTERNAL MEDICINE

## 2023-02-06 PROCEDURE — 36415 COLL VENOUS BLD VENIPUNCTURE: CPT | Performed by: INTERNAL MEDICINE

## 2023-02-06 PROCEDURE — 3008F PR BODY MASS INDEX (BMI) DOCUMENTED: ICD-10-PCS | Mod: CPTII,S$GLB,, | Performed by: INTERNAL MEDICINE

## 2023-02-06 PROCEDURE — 86140 C-REACTIVE PROTEIN: CPT | Performed by: INTERNAL MEDICINE

## 2023-02-06 PROCEDURE — 1159F MED LIST DOCD IN RCRD: CPT | Mod: CPTII,S$GLB,, | Performed by: INTERNAL MEDICINE

## 2023-02-06 NOTE — PROGRESS NOTES
Subjective:       Patient ID: Ivonne Arevalo is a 58 y.o. female.     Chief Complaint:  F/u for thrombocytosis     Hematologic History:  1. Ms. Arevalo is a 52 yo woman who saw me on 6/14/18 for easy bruising and thrombocytosis. She has had easy bruising for many years, but notices it is getting worse more recently. Also has nose bleeds every now and then which usually takes 15-20 minutes to stop. Had cervical spine surgery 10 years ago without complications.  Plt count was 415 in 2011, 437 on 12/10/2013. On 5/30/2018, WBC 8.83, Hb 14.2, Hct 42.7, plt count 462. CMP normal. On 6/4/2018, INR, APTT normal. Plt function test normal.  2. VWF antigen and activity on 6/14/18 normal. Ferritin mildly elevated at 332, iron studies normal. CRP normal, ESR mildly elevated at 27. JAK2, MPL, MELISSA negative. BCR/ABL negative.   3. Underwent BMBx on 6/28/18 for thrombocytosis.   CELLULARITY=60-70%, TRILINEAGE HEMATOPOIETIC ACTIVITY (M/E=3.9:1). SEE COMMENT.  ADEQUATE STORAGE IRON.  ADEQUATE NUMBER OF MEGAKARYOCYTES.  COMMENT: Flow cytometry analysis of bone marrow aspirate shows lymph gate(6%) containing T and B cells. No  B cell clonality or T-cell aberrancy is evident. Blast gate is not increased.  Immunohistochemical studies were performed on the core biopsy for further architecture evaluation with adequate  positive and negative controls. Scattered mixed predominantly T cells (CD3 positive) and B cells (CD20 positive)  are evident. About 4-5% plasma cells ( positive) are noted. CD61 highlights adequate number of  megakaryocytes.  See ABEL report:  Peripheral blood, JAK2 V617F mutation analysis: Negative for JAK2 V617F.  Peripheral blood, CALR mutation analysis, exon 9. Negative. No deletion or insertion was detected in CALR, exon  9.  BCR/ABL, FISH (D-FISH), Blood: The result is within normal limits for the BCR and ABL1.  There is no definitive morphologic evidence of myeloproliferative neoplasm. Correlate with a cytogenetics  report  and NGS report to rule out clonal hematopoiesis. A supplemental report will follow.     Bone marrow karyotype results: 46, XX[20], female karyotype.     OncoHeme (NGS) Hematologic Neoplasms, BM: No pathogenic genetic alteration is detected in the listed gene  regions. This finding does not exclude the presence of genetic alteration occurring at allele frequency below our  established detection limit of 5-10%, or other genetic alterations present in untested gene regions.     Interval History:   Ms. Arevalo returns today for follow up. Feeling well. Taking baby aspirin daily.      ROS:   See HPI. Otherwise neg.      Physical Examination:   Vital signs reviewed.   Gen: well hydrated, well developed, in no acute distress.  HEENT: normocephalic, anicteric, PERRLA, EOMI, oropharynx clear  Neck: supple, no JVD, thyromegaly, cervical or supraclavicular LAD  Lungs: CTAB, no wheezes or rales  Heart: RRR, no M/R/G  Abdomen: soft, no tenderness, non-distended, no hepatosplenomegaly, mass, or hernia.   Ext: no cyanosis, edema, deformity  Neuro: alert and oriented x 4, no focal neuro deficit  Skin: very mild erythema at the incision site of the right forearm  Psych: pleasant and appropriate mood and affect     Objective:      Laboratory Data:  Reviewed lab results with patient. Platelet count 411 normal        Assessment/Plan:      1. Thrombocytosis    2. Hypothyroidism, unspecified type         1.  - Ms Arevalo is a 55 yo woman with chronic thrombocytosis. Extensive workup including MPN reflex panel a bone marrow biopsy have been unremarkable. Thrombocytosis likely reactive  - reviewed lab result with patient. Platelet count is normal today  - discussed with patient that her chronic thrombocytopenia is likely reactive. It might happen in the future that her platelet count is high again. Her baseline is in the 400s. If it remains within this range, no further workup is necessary. But if it increases all of a sudden to the  600-800s range or above, I do feel we need to re-evaluate again with possibly another bone marrow biopsy.   - patient will f/u with her PCP for yearly CBC. We are not making a f/u appt at this point. But if questions or symptoms arise in the future, we are happy to see her back  - above discussed with patient. Patient understands and agrees with the plan.   - c/w baby aspirin daily. She does have a strong family history of stroke and heart attack  - Knows to call should questions arise or symptoms develop    2.   - on synthroid  - f/u with PCP    Route Chart for Scheduling    Med Onc Chart Routing      Follow up with physician No follow up needed.   Follow up with RASHMI    Infusion scheduling note    Injection scheduling note    Labs    Imaging    Pharmacy appointment    Other referrals

## 2023-02-08 ENCOUNTER — OFFICE VISIT (OUTPATIENT)
Dept: CARDIOLOGY | Facility: CLINIC | Age: 58
End: 2023-02-08
Payer: COMMERCIAL

## 2023-02-08 VITALS
BODY MASS INDEX: 29.02 KG/M2 | HEART RATE: 85 BPM | HEIGHT: 65 IN | DIASTOLIC BLOOD PRESSURE: 85 MMHG | WEIGHT: 174.19 LBS | SYSTOLIC BLOOD PRESSURE: 131 MMHG

## 2023-02-08 DIAGNOSIS — Z82.49 FAMILY HISTORY OF EARLY CAD: Primary | ICD-10-CM

## 2023-02-08 DIAGNOSIS — E78.2 MIXED HYPERLIPIDEMIA: ICD-10-CM

## 2023-02-08 PROCEDURE — 99214 PR OFFICE/OUTPT VISIT, EST, LEVL IV, 30-39 MIN: ICD-10-PCS | Mod: S$GLB,,, | Performed by: INTERNAL MEDICINE

## 2023-02-08 PROCEDURE — 99999 PR PBB SHADOW E&M-EST. PATIENT-LVL III: CPT | Mod: PBBFAC,,, | Performed by: INTERNAL MEDICINE

## 2023-02-08 PROCEDURE — 3008F BODY MASS INDEX DOCD: CPT | Mod: CPTII,S$GLB,, | Performed by: INTERNAL MEDICINE

## 2023-02-08 PROCEDURE — 1159F MED LIST DOCD IN RCRD: CPT | Mod: CPTII,S$GLB,, | Performed by: INTERNAL MEDICINE

## 2023-02-08 PROCEDURE — 1160F RVW MEDS BY RX/DR IN RCRD: CPT | Mod: CPTII,S$GLB,, | Performed by: INTERNAL MEDICINE

## 2023-02-08 PROCEDURE — 1160F PR REVIEW ALL MEDS BY PRESCRIBER/CLIN PHARMACIST DOCUMENTED: ICD-10-PCS | Mod: CPTII,S$GLB,, | Performed by: INTERNAL MEDICINE

## 2023-02-08 PROCEDURE — 3075F PR MOST RECENT SYSTOLIC BLOOD PRESS GE 130-139MM HG: ICD-10-PCS | Mod: CPTII,S$GLB,, | Performed by: INTERNAL MEDICINE

## 2023-02-08 PROCEDURE — 1159F PR MEDICATION LIST DOCUMENTED IN MEDICAL RECORD: ICD-10-PCS | Mod: CPTII,S$GLB,, | Performed by: INTERNAL MEDICINE

## 2023-02-08 PROCEDURE — 3008F PR BODY MASS INDEX (BMI) DOCUMENTED: ICD-10-PCS | Mod: CPTII,S$GLB,, | Performed by: INTERNAL MEDICINE

## 2023-02-08 PROCEDURE — 3079F PR MOST RECENT DIASTOLIC BLOOD PRESSURE 80-89 MM HG: ICD-10-PCS | Mod: CPTII,S$GLB,, | Performed by: INTERNAL MEDICINE

## 2023-02-08 PROCEDURE — 99214 OFFICE O/P EST MOD 30 MIN: CPT | Mod: S$GLB,,, | Performed by: INTERNAL MEDICINE

## 2023-02-08 PROCEDURE — 3075F SYST BP GE 130 - 139MM HG: CPT | Mod: CPTII,S$GLB,, | Performed by: INTERNAL MEDICINE

## 2023-02-08 PROCEDURE — 3079F DIAST BP 80-89 MM HG: CPT | Mod: CPTII,S$GLB,, | Performed by: INTERNAL MEDICINE

## 2023-02-08 PROCEDURE — 99999 PR PBB SHADOW E&M-EST. PATIENT-LVL III: ICD-10-PCS | Mod: PBBFAC,,, | Performed by: INTERNAL MEDICINE

## 2023-02-08 RX ORDER — EZETIMIBE AND SIMVASTATIN 10; 20 MG/1; MG/1
1 TABLET ORAL NIGHTLY
Qty: 90 TABLET | Refills: 3 | Status: SHIPPED | OUTPATIENT
Start: 2023-02-08 | End: 2023-11-16

## 2023-02-08 NOTE — PROGRESS NOTES
HISTORY:    59 yo F with a h/o hyperlipidemia, premature FH, thrombocytosis with unremarkable mccrary, and hypothyroidism presenting for follow-up.    The patient denies any symptoms of chest pain, shortness of breath, or dyspnea on exertion. Active in her life averaging over 10k steps/day.     The patient denies any previous history of myocardial infarction, coronary artery disease, peripheral arterial disease, stroke, congestive heart failure, or cardiomyopathy.    Brother had an MI in his 40s and sister had a CVA in her 50s. Non-smoker.    Tolerates asa 81x1 and omega 3 fatty acids. We tried rosuvastatin 5x1 and she did not diffuse body cramping. Now tolerating simvastatin 10x1.       MEDICATIONS:      Current Outpatient Medications:     finasteride (PROSCAR) 5 mg tablet, Take 5 mg by mouth once daily., Disp: , Rfl:     levothyroxine (SYNTHROID) 50 MCG tablet, Take 1 tablet (50 mcg total) by mouth once daily., Disp: 90 tablet, Rfl: 3    montelukast (SINGULAIR) 10 mg tablet, TAKE 1 TABLET BY MOUTH EVERY DAY IN THE EVENING, Disp: 90 tablet, Rfl: 3    omeprazole (PRILOSEC) 20 MG capsule, TAKE 1 CAPSULE BY MOUTH EVERY DAY, Disp: 90 capsule, Rfl: 3    spironolactone (ALDACTONE) 100 MG tablet, Take 200 mg by mouth once daily., Disp: , Rfl: 6    ezetimibe-simvastatin 10-20 mg (VYTORIN) 10-20 mg per tablet, Take 1 tablet by mouth every evening., Disp: 90 tablet, Rfl: 3      PHYSICAL EXAM:    Vitals:    02/08/23 1026   BP: 131/85   Pulse: 85         NAD, A+Ox3.  No jvd, no bruit.  RRR nml s1,s2. No murmurs.  CTA B no wheezes or crackles.  2+ B radial and 1+ B DP/PT. No edema.    LABS/STUDIES (imaging reviewed during clinic visit):    February 2023 CBC normal.  October 2022 hemoglobin 13.9.  Creatinine 0.9 with BUN of 16.  Normal TSH and A1c.   and HDL 39. Tgs 183.  ECG 2022 NSR no Qs/STs  SAVANAH 2017 7.6 minutes on a high ramp protocol with no ischemia.  Normal TTE at baseline and with stress.      ASSESSMENT &  PLAN:    1. Family history of early CAD    2. Mixed hyperlipidemia          Orders Placed This Encounter    Lipid Panel    ezetimibe-simvastatin 10-20 mg (VYTORIN) 10-20 mg per tablet          Hyperlipidemia with a +FH. Did not tolerate rosuvastatin 5. Tolerating simvastatin 10x1. Will increase to 20x1 and add ezetimibe 10x1.     Diet modifications and exercise discussed. Actually doing fairly well from an exercise standpoint. Bps controlled. On spironolactone for acne and finasteride for hair loss.         Myra Begum MD

## 2023-03-14 ENCOUNTER — CLINICAL SUPPORT (OUTPATIENT)
Dept: ENDOSCOPY | Facility: HOSPITAL | Age: 58
End: 2023-03-14
Attending: INTERNAL MEDICINE
Payer: COMMERCIAL

## 2023-03-14 VITALS — BODY MASS INDEX: 27.32 KG/M2 | WEIGHT: 170 LBS | HEIGHT: 66 IN

## 2023-03-14 DIAGNOSIS — K21.9 GASTROESOPHAGEAL REFLUX DISEASE, UNSPECIFIED WHETHER ESOPHAGITIS PRESENT: ICD-10-CM

## 2023-03-24 ENCOUNTER — ANESTHESIA EVENT (OUTPATIENT)
Dept: ENDOSCOPY | Facility: HOSPITAL | Age: 58
End: 2023-03-24
Payer: COMMERCIAL

## 2023-03-24 NOTE — ANESTHESIA PREPROCEDURE EVALUATION
03/24/2023  Ivonne Arevalo is a 58 y.o., female.  Past Medical History:   Diagnosis Date    Basal cell cancer     followed by Dr Burnett    Family history of diabetes mellitus (DM)     Family history of early CAD     Mild hyperlipidemia     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 Burnett (excision)    Stress incontinence      Past Surgical History:   Procedure Laterality Date    BONE MARROW BIOPSY Left 6/28/2018    Procedure: Biopsy-bone marrow;  Surgeon: Saul Freitas MD;  Location: [facility] OR [address] [place] [address];  Service: Oncology;  Laterality: Left;    CERVICAL DISC ARTHROPLASTY  2008    ENDOMETRIAL ABLATION      hammertoe surgery  4/26/15    right    TOE SURGERY Right     TOTAL SHOULDER ARTHROPLASTY  2008           Pre-op Assessment    I have reviewed the Patient Summary Reports.    I have reviewed the NPO Status.   I have reviewed the Medications.     Review of Systems  Anesthesia Hx:  No problems with previous Anesthesia  Denies Family Hx of Anesthesia complications.   Denies Personal Hx of Anesthesia complications.   Hematology/Oncology:         -- Cancer in past history: Other (see Oncology comments) Oncology Comments: Basal and squamous cell cancer     EENT/Dental:EENT/Dental Normal   Cardiovascular:   Exercise tolerance: good  Denies Angina. hyperlipidemia  Denies MORRIS.    Pulmonary:  Pulmonary Normal    Renal/:  Renal/ Normal     Hepatic/GI:   GERD    Musculoskeletal:  Musculoskeletal Normal    Endocrine:   Hypothyroidism    Dermatological:  Skin Normal    Psych:  Psychiatric Normal           Physical Exam  General: Well nourished, Cooperative, Alert and Oriented    Airway:  Mallampati: II   Mouth Opening: Normal  TM Distance: Normal  Tongue: Normal  Neck ROM: Normal ROM    Dental:  Intact        Anesthesia Plan  Type of Anesthesia, risks & benefits  discussed:    Anesthesia Type: Gen Natural Airway  Intra-op Monitoring Plan: Standard ASA Monitors  Post Op Pain Control Plan: multimodal analgesia  Induction:  IV  Informed Consent: Informed consent signed with the Patient and all parties understand the risks and agree with anesthesia plan.  All questions answered. Patient consented to blood products? No  ASA Score: 2  Day of Surgery Review of History & Physical: H&P Update referred to the surgeon/provider.    Ready For Surgery From Anesthesia Perspective.     .

## 2023-03-27 ENCOUNTER — HOSPITAL ENCOUNTER (OUTPATIENT)
Facility: HOSPITAL | Age: 58
Discharge: HOME | End: 2023-03-27
Attending: INTERNAL MEDICINE | Admitting: INTERNAL MEDICINE
Payer: COMMERCIAL

## 2023-03-27 ENCOUNTER — ANESTHESIA (OUTPATIENT)
Dept: ENDOSCOPY | Facility: HOSPITAL | Age: 58
End: 2023-03-27
Payer: COMMERCIAL

## 2023-03-27 VITALS
DIASTOLIC BLOOD PRESSURE: 85 MMHG | SYSTOLIC BLOOD PRESSURE: 130 MMHG | HEIGHT: 66 IN | WEIGHT: 170 LBS | OXYGEN SATURATION: 100 % | RESPIRATION RATE: 16 BRPM | BODY MASS INDEX: 27.32 KG/M2 | HEART RATE: 78 BPM | TEMPERATURE: 98 F

## 2023-03-27 DIAGNOSIS — R13.10 DYSPHAGIA: ICD-10-CM

## 2023-03-27 DIAGNOSIS — K21.00 GASTROESOPHAGEAL REFLUX DISEASE WITH ESOPHAGITIS, UNSPECIFIED WHETHER HEMORRHAGE: Primary | ICD-10-CM

## 2023-03-27 DIAGNOSIS — R13.19 ESOPHAGEAL DYSPHAGIA: ICD-10-CM

## 2023-03-27 PROCEDURE — 88342 IMHCHEM/IMCYTCHM 1ST ANTB: CPT | Performed by: PATHOLOGY

## 2023-03-27 PROCEDURE — 25000003 PHARM REV CODE 250: Performed by: REGISTERED NURSE

## 2023-03-27 PROCEDURE — 27201012 HC FORCEPS, HOT/COLD, DISP: Performed by: INTERNAL MEDICINE

## 2023-03-27 PROCEDURE — 43239 EGD BIOPSY SINGLE/MULTIPLE: CPT | Mod: 59 | Performed by: INTERNAL MEDICINE

## 2023-03-27 PROCEDURE — 94761 N-INVAS EAR/PLS OXIMETRY MLT: CPT

## 2023-03-27 PROCEDURE — 82657 ENZYME CELL ACTIVITY: CPT | Performed by: STUDENT IN AN ORGANIZED HEALTH CARE EDUCATION/TRAINING PROGRAM

## 2023-03-27 PROCEDURE — 43249 PR EGD, FLEX, W/BALL DILATION, < 30MM: ICD-10-PCS | Mod: ,,, | Performed by: INTERNAL MEDICINE

## 2023-03-27 PROCEDURE — 43239 EGD BIOPSY SINGLE/MULTIPLE: CPT | Mod: 59,,, | Performed by: INTERNAL MEDICINE

## 2023-03-27 PROCEDURE — 37000008 HC ANESTHESIA 1ST 15 MINUTES: Performed by: INTERNAL MEDICINE

## 2023-03-27 PROCEDURE — 63600175 PHARM REV CODE 636 W HCPCS: Performed by: REGISTERED NURSE

## 2023-03-27 PROCEDURE — 88342 CHG IMMUNOCYTOCHEMISTRY: ICD-10-PCS | Mod: 26,,, | Performed by: PATHOLOGY

## 2023-03-27 PROCEDURE — 88342 IMHCHEM/IMCYTCHM 1ST ANTB: CPT | Mod: 26,,, | Performed by: PATHOLOGY

## 2023-03-27 PROCEDURE — 43239 PR EGD, FLEX, W/BIOPSY, SGL/MULTI: ICD-10-PCS | Mod: 59,,, | Performed by: INTERNAL MEDICINE

## 2023-03-27 PROCEDURE — D9220A PRA ANESTHESIA: ICD-10-PCS | Mod: ,,, | Performed by: REGISTERED NURSE

## 2023-03-27 PROCEDURE — 88305 TISSUE EXAM BY PATHOLOGIST: CPT | Mod: 59 | Performed by: PATHOLOGY

## 2023-03-27 PROCEDURE — 99900035 HC TECH TIME PER 15 MIN (STAT)

## 2023-03-27 PROCEDURE — 88305 TISSUE EXAM BY PATHOLOGIST: CPT | Mod: 26,,, | Performed by: PATHOLOGY

## 2023-03-27 PROCEDURE — C1726 CATH, BAL DIL, NON-VASCULAR: HCPCS | Performed by: INTERNAL MEDICINE

## 2023-03-27 PROCEDURE — 88305 TISSUE EXAM BY PATHOLOGIST: ICD-10-PCS | Mod: 26,,, | Performed by: PATHOLOGY

## 2023-03-27 PROCEDURE — D9220A PRA ANESTHESIA: Mod: ,,, | Performed by: REGISTERED NURSE

## 2023-03-27 PROCEDURE — 43249 ESOPH EGD DILATION <30 MM: CPT | Performed by: INTERNAL MEDICINE

## 2023-03-27 PROCEDURE — 37000009 HC ANESTHESIA EA ADD 15 MINS: Performed by: INTERNAL MEDICINE

## 2023-03-27 PROCEDURE — 43249 ESOPH EGD DILATION <30 MM: CPT | Mod: ,,, | Performed by: INTERNAL MEDICINE

## 2023-03-27 RX ORDER — ASPIRIN 81 MG/1
81 TABLET ORAL DAILY
COMMUNITY

## 2023-03-27 RX ORDER — LIDOCAINE HYDROCHLORIDE 20 MG/ML
INJECTION INTRAVENOUS
Status: DISCONTINUED | OUTPATIENT
Start: 2023-03-27 | End: 2023-03-27

## 2023-03-27 RX ORDER — SODIUM CHLORIDE 9 MG/ML
INJECTION, SOLUTION INTRAVENOUS CONTINUOUS PRN
Status: DISCONTINUED | OUTPATIENT
Start: 2023-03-27 | End: 2023-03-27

## 2023-03-27 RX ORDER — PROPOFOL 10 MG/ML
VIAL (ML) INTRAVENOUS CONTINUOUS PRN
Status: DISCONTINUED | OUTPATIENT
Start: 2023-03-27 | End: 2023-03-27

## 2023-03-27 RX ORDER — PROPOFOL 10 MG/ML
VIAL (ML) INTRAVENOUS
Status: DISCONTINUED | OUTPATIENT
Start: 2023-03-27 | End: 2023-03-27

## 2023-03-27 RX ADMIN — GLYCOPYRROLATE 0.2 MG: 0.2 INJECTION, SOLUTION INTRAMUSCULAR; INTRAVENOUS at 07:03

## 2023-03-27 RX ADMIN — PROPOFOL 100 MG: 10 INJECTION, EMULSION INTRAVENOUS at 07:03

## 2023-03-27 RX ADMIN — LIDOCAINE HYDROCHLORIDE 100 MG: 20 INJECTION INTRAVENOUS at 07:03

## 2023-03-27 RX ADMIN — SODIUM CHLORIDE: 0.9 INJECTION, SOLUTION INTRAVENOUS at 07:03

## 2023-03-27 RX ADMIN — PROPOFOL 200 MCG/KG/MIN: 10 INJECTION, EMULSION INTRAVENOUS at 07:03

## 2023-03-27 NOTE — PROVATION PATIENT INSTRUCTIONS
Discharge Summary/Instructions after an Endoscopic Procedure  Patient Name: Iovnne Arevalo  Patient MRN: [id number]  Patient YOB: 1965 Monday, March 27, 2023  Mundo George MD  Dear patient,  As a result of recent federal legislation (The Federal Cures Act), you may   receive lab or pathology results from your procedure in your [facility]   account before your physician is able to contact you. Your physician or   their representative will relay the results to you with their   recommendations at their soonest availability.  Thank you,  RESTRICTIONS:  During your procedure today, you received medications for sedation.  These   medications may affect your judgment, balance and coordination.  Therefore,   for 24 hours, you have the following restrictions:   - DO NOT drive a car, operate machinery, make legal/financial decisions,   sign important papers or drink alcohol.    ACTIVITY:  Today: no heavy lifting, straining or running due to procedural   sedation/anesthesia.  The following day: return to full activity including work.  DIET:  Eat and drink normally unless instructed otherwise.     TREATMENT FOR COMMON SIDE EFFECTS:  - Mild abdominal pain, nausea, belching, bloating or excessive gas:  rest,   eat lightly and use a heating pad.  - Sore Throat: treat with throat lozenges and/or gargle with warm salt   water.  - Because air was used during the procedure, expelling large amounts of air   from your rectum or belching is normal.  - If a bowel prep was taken, you may not have a bowel movement for 1-3 days.    This is normal.  SYMPTOMS TO WATCH FOR AND REPORT TO YOUR PHYSICIAN:  1. Abdominal pain or bloating, other than gas cramps.  2. Chest pain.  3. Back pain.  4. Signs of infection such as: chills or fever occurring within 24 hours   after the procedure.  5. Rectal bleeding, which would show as bright red, maroon, or black stools.   (A tablespoon of blood from the rectum is not serious, especially if    hemorrhoids are present.)  6. Vomiting.  7. Weakness or dizziness.  GO DIRECTLY TO THE NEAREST EMERGENCY ROOM IF YOU HAVE ANY OF THE FOLLOWING:      Difficulty breathing              Chills and/or fever over 101 F   Persistent vomiting and/or vomiting blood   Severe abdominal pain   Severe chest pain   Black, tarry stools   Bleeding- more than one tablespoon   Any other symptom or condition that you feel may need urgent attention  Your doctor recommends these additional instructions:  If any biopsies were taken, your doctors clinic will contact you in 1 to 2   weeks with any results.  - Patient has a contact number available for emergencies.  The signs and   symptoms of potential delayed complications were discussed with the   patient.  Return to normal activities tomorrow.  Written discharge   instructions were provided to the patient.   - Discharge patient to home.   - Resume previous diet.   - Continue present medications.   - Await pathology results.   For questions, problems or results please call your physician - Mundo George MD at Work:  [phone].  [facility], EMERGENCY ROOM PHONE NUMBER: [phone]  IF A COMPLICATION OR EMERGENCY SITUATION ARISES AND YOU ARE UNABLE TO REACH   YOUR PHYSICIAN - GO DIRECTLY TO THE EMERGENCY ROOM.  Mundo George MD  3/27/2023 8:07:28 AM  This report has been verified and signed electronically.  Dear patient,  As a result of recent federal legislation (The Federal Cures Act), you may   receive lab or pathology results from your procedure in your [facility]   account before your physician is able to contact you. Your physician or   their representative will relay the results to you with their   recommendations at their soonest availability.  Thank you,  PROVATION

## 2023-03-27 NOTE — PLAN OF CARE
Discharge instructions given and explained to patient with verbalization of understanding all instructions. Patients v/s stable, denies n/v and tolerating po, rates pain level tolerable, IV removed, patient ready to discharge home.

## 2023-03-27 NOTE — PLAN OF CARE
Pre-op care completed. Personal belongings placed in locker #6. Spouse at bedside. Call light in reach. Instructed to call for assistance with understanding verbalized.

## 2023-03-27 NOTE — TRANSFER OF CARE
"Anesthesia Transfer of Care Note    Patient: Ivonne Arevalo    Procedure(s) Performed: Procedure(s) (LRB):  ESOPHAGOGASTRODUODENOSCOPY (EGD) (N/A)    Patient location: PACU    Anesthesia Type: general    Transport from OR: Transported from OR on room air with adequate spontaneous ventilation    Post pain: adequate analgesia    Post assessment: no apparent anesthetic complications and tolerated procedure well    Post vital signs: stable    Level of consciousness: awake, alert and oriented    Nausea/Vomiting: no nausea/vomiting    Complications: none    Transfer of care protocol was followed      Last vitals:   Visit Vitals  /72   Pulse 75   Temp 36 °C (96.8 °F) (Temporal)   Resp 18   Ht 5' 6" (1.676 m)   Wt 77.1 kg (170 lb)   SpO2 95%   Breastfeeding No   BMI 27.44 kg/m²     "

## 2023-03-27 NOTE — ANESTHESIA POSTPROCEDURE EVALUATION
Anesthesia Post Evaluation    Patient: Ivonne Arevalo    Procedure(s) Performed: Procedure(s) (LRB):  ESOPHAGOGASTRODUODENOSCOPY (EGD) (N/A)    Final Anesthesia Type: general      Patient location during evaluation: GI PACU  Patient participation: Yes- Able to Participate  Level of consciousness: awake and alert  Post-procedure vital signs: reviewed and not stable  Pain management: adequate  Airway patency: patent    PONV status at discharge: No PONV  Anesthetic complications: no      Cardiovascular status: blood pressure returned to baseline  Respiratory status: unassisted and spontaneous ventilation  Hydration status: euvolemic  Follow-up not needed.          Vitals Value Taken Time   /70 03/27/23 0807     03/27/23 0810   Pulse 84 03/27/23 0808   Resp 12 03/27/23 0810   SpO2 92 % 03/27/23 0808   Vitals shown include unvalidated device data.      No case tracking events are documented in the log.      Pain/Bryan Score: Bryan Score: 10 (3/27/2023  8:05 AM)

## 2023-03-27 NOTE — H&P
Short Stay Endoscopy History and Physical    PCP - Shari Holman MD    Procedure - EGD  Sedation: GA  ASA - per anesthesia  Mallampati - per anesthesia  History of Anesthesia problems - no  Family history Anesthesia problems -  no     HPI:  This is a 58 y.o. female here for evaluation of : Dysphagia, GERD    Reflux - yes  Dysphagia - yes  Abdominal pain - no  Diarrhea - no    ROS:  Constitutional: No fevers, chills, No weight loss  ENT: No allergies  CV: No chest pain  Pulm: No cough, No shortness of breath  Ophtho: No vision changes  GI: see HPI  Medical History:  has a past medical history of Basal cell cancer, Family history of diabetes mellitus (DM), Family history of early CAD, Mild hyperlipidemia, Squamous cell cancer of skin of forearm, right, and Stress incontinence.    Surgical History:  has a past surgical history that includes Total shoulder arthroplasty (2008); Cervical disc arthroplasty (2008); Endometrial ablation; hammertoe surgery (4/26/15); Toe Surgery (Right); and Bone marrow biopsy (Left, 6/28/2018).    Family History: family history includes Alzheimer's disease in her mother; Arthritis in her brother and maternal grandmother; Cancer in her maternal uncle; Cataracts in her father; Cirrhosis in her father; Coronary artery disease (age of onset: 42) in her brother; Coronary artery disease (age of onset: 56) in her sister; Depression in her mother; Diabetes in her father, maternal grandmother, and paternal grandmother; Heart attack in her brother; Heart disease in her brother, father, maternal aunt, maternal grandmother, maternal uncle, mother, and paternal grandmother; Hyperlipidemia in her sister and sister; Hypertension in her brother, father, maternal aunt, maternal grandmother, maternal uncle, and mother; Kidney disease in her father; Stroke in her mother; Stroke (age of onset: 50) in her sister.. Otherwise no colon cancer, inflammatory bowel disease, or GI malignancies.    Social History:   reports that she has never smoked. She has never used smokeless tobacco. She reports current alcohol use of about 2.0 standard drinks per week. She reports that she does not use drugs.    Review of patient's allergies indicates:  No Known Allergies    Medications:   Medications Prior to Admission   Medication Sig Dispense Refill Last Dose    aspirin (ECOTRIN) 81 MG EC tablet Take 81 mg by mouth once daily.   3/26/2023    ezetimibe-simvastatin 10-20 mg (VYTORIN) 10-20 mg per tablet Take 1 tablet by mouth every evening. 90 tablet 3 3/26/2023    finasteride (PROSCAR) 5 mg tablet Take 5 mg by mouth once daily.   3/26/2023    levothyroxine (SYNTHROID) 50 MCG tablet Take 1 tablet (50 mcg total) by mouth once daily. 90 tablet 3 3/26/2023    montelukast (SINGULAIR) 10 mg tablet TAKE 1 TABLET BY MOUTH EVERY DAY IN THE EVENING 90 tablet 3 3/26/2023    omeprazole (PRILOSEC) 20 MG capsule TAKE 1 CAPSULE BY MOUTH EVERY DAY 90 capsule 3 3/26/2023    spironolactone (ALDACTONE) 100 MG tablet Take 200 mg by mouth once daily.  6 3/26/2023       Objective Findings:    Vital Signs: Per nursing notes.    Physical Exam:  General Appearance: Well appearing in no acute distress  Head:   Normocephalic, without obvious abnormality  Eyes:    No scleral icterus  Airway: Open  Neck: No restriction in mobility  Lungs: CTA bilaterally in anterior and posterior fields, no wheezes, no crackles.  Heart:  Regular rate and rhythm, S1, S2 normal, no murmurs heard  Abdomen: Soft, non tender, non distended      Labs:  Lab Results   Component Value Date    WBC 8.83 02/06/2023    HGB 14.9 02/06/2023    HCT 43.4 02/06/2023     02/06/2023    CHOL 225 (H) 10/21/2022    TRIG 183 (H) 10/21/2022    HDL 39 (L) 10/21/2022    ALT 16 10/19/2021    AST 22 10/19/2021     10/21/2022    K 4.7 10/21/2022     10/21/2022    CREATININE 0.9 10/21/2022    BUN 16 10/21/2022    CO2 22 (L) 10/21/2022    TSH 2.076 10/25/2022    INR 1.0 06/04/2018    HGBA1C 5.4  10/21/2022         I have explained the risks and benefits of endoscopy procedures to the patient including but not limited to bleeding, perforation, infection, and death.    Thank you so much for allowing me to participate in the care of Ivonne George MD

## 2023-03-31 ENCOUNTER — TELEPHONE (OUTPATIENT)
Dept: GASTROENTEROLOGY | Facility: CLINIC | Age: 58
End: 2023-03-31
Payer: COMMERCIAL

## 2023-03-31 LAB
FINAL PATHOLOGIC DIAGNOSIS: NORMAL
Lab: NORMAL

## 2023-03-31 NOTE — TELEPHONE ENCOUNTER
----- Message from Mundo George MD sent at 3/31/2023  9:55 AM CDT -----  No lactose intolerance on biopsies.

## 2023-04-03 ENCOUNTER — TELEPHONE (OUTPATIENT)
Dept: ENDOSCOPY | Facility: HOSPITAL | Age: 58
End: 2023-04-03
Payer: COMMERCIAL

## 2023-04-03 LAB
FINAL PATHOLOGIC DIAGNOSIS: NORMAL
GROSS: NORMAL
Lab: NORMAL

## 2023-04-04 NOTE — TELEPHONE ENCOUNTER
----- Message from Mundo George MD sent at 4/3/2023  4:16 PM CDT -----  Please notify patient, nothing concerning on biopsies.

## 2023-04-18 ENCOUNTER — PATIENT MESSAGE (OUTPATIENT)
Dept: ADMINISTRATIVE | Facility: HOSPITAL | Age: 58
End: 2023-04-18
Payer: COMMERCIAL

## 2023-05-06 ENCOUNTER — HOSPITAL ENCOUNTER (EMERGENCY)
Facility: HOSPITAL | Age: 58
Discharge: HOME | End: 2023-05-07
Attending: EMERGENCY MEDICINE
Payer: COMMERCIAL

## 2023-05-06 DIAGNOSIS — R41.82 ALTERED MENTAL STATUS, UNSPECIFIED ALTERED MENTAL STATUS TYPE: Primary | ICD-10-CM

## 2023-05-06 DIAGNOSIS — F10.920 ALCOHOLIC INTOXICATION WITHOUT COMPLICATION: ICD-10-CM
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
## 2023-05-06 LAB
BASOPHILS # BLD AUTO: 0.04 K/UL (ref 0–0.2)
BASOPHILS NFR BLD: 0.4 % (ref 0–1.9)
DIFFERENTIAL METHOD: ABNORMAL
EOSINOPHIL # BLD AUTO: 0.1 K/UL (ref 0–0.5)
EOSINOPHIL NFR BLD: 0.7 % (ref 0–8)
ERYTHROCYTE [DISTWIDTH] IN BLOOD BY AUTOMATED COUNT: 11.5 % (ref 11.5–14.5)
ETHANOL SERPL-MCNC: 150 MG/DL
HCT VFR BLD AUTO: 44.1 % (ref 37–48.5)
HGB BLD-MCNC: 15.8 G/DL (ref 12–16)
IMM GRANULOCYTES # BLD AUTO: 0.04 K/UL (ref 0–0.04)
IMM GRANULOCYTES NFR BLD AUTO: 0.4 % (ref 0–0.5)
LYMPHOCYTES # BLD AUTO: 3.5 K/UL (ref 1–4.8)
LYMPHOCYTES NFR BLD: 36.7 % (ref 18–48)
MCH RBC QN AUTO: 32.7 PG (ref 27–31)
MCHC RBC AUTO-ENTMCNC: 35.8 G/DL (ref 32–36)
MCV RBC AUTO: 91 FL (ref 82–98)
MONOCYTES # BLD AUTO: 0.7 K/UL (ref 0.3–1)
MONOCYTES NFR BLD: 7.4 % (ref 4–15)
NEUTROPHILS # BLD AUTO: 5.2 K/UL (ref 1.8–7.7)
NEUTROPHILS NFR BLD: 54.4 % (ref 38–73)
NRBC BLD-RTO: 0 /100 WBC
PLATELET # BLD AUTO: 467 K/UL (ref 150–450)
PMV BLD AUTO: 9.4 FL (ref 9.2–12.9)
RBC # BLD AUTO: 4.83 M/UL (ref 4–5.4)
TSH SERPL DL<=0.005 MIU/L-ACNC: 3.95 UIU/ML (ref 0.4–4)
WBC # BLD AUTO: 9.56 K/UL (ref 3.9–12.7)

## 2023-05-06 PROCEDURE — 99284 EMERGENCY DEPT VISIT MOD MDM: CPT | Mod: GC,,, | Performed by: EMERGENCY MEDICINE

## 2023-05-06 PROCEDURE — 85025 COMPLETE CBC W/AUTO DIFF WBC: CPT

## 2023-05-06 PROCEDURE — 63600175 PHARM REV CODE 636 W HCPCS

## 2023-05-06 PROCEDURE — 99284 PR EMERGENCY DEPT VISIT,LEVEL IV: ICD-10-PCS | Mod: GC,,, | Performed by: EMERGENCY MEDICINE

## 2023-05-06 PROCEDURE — 82077 ASSAY SPEC XCP UR&BREATH IA: CPT

## 2023-05-06 PROCEDURE — 84443 ASSAY THYROID STIM HORMONE: CPT

## 2023-05-06 PROCEDURE — 99285 EMERGENCY DEPT VISIT HI MDM: CPT | Mod: 25

## 2023-05-06 PROCEDURE — 96361 HYDRATE IV INFUSION ADD-ON: CPT

## 2023-05-06 PROCEDURE — 96374 THER/PROPH/DIAG INJ IV PUSH: CPT

## 2023-05-06 PROCEDURE — 80053 COMPREHEN METABOLIC PANEL: CPT | Performed by: EMERGENCY MEDICINE

## 2023-05-06 PROCEDURE — 25000003 PHARM REV CODE 250

## 2023-05-06 RX ORDER — LORAZEPAM 2 MG/ML
1 INJECTION INTRAMUSCULAR
Status: COMPLETED | OUTPATIENT
Start: 2023-05-06 | End: 2023-05-06

## 2023-05-06 RX ADMIN — SODIUM CHLORIDE 1000 ML: 9 INJECTION, SOLUTION INTRAVENOUS at 10:05

## 2023-05-06 RX ADMIN — LORAZEPAM 1 MG: 2 INJECTION INTRAMUSCULAR; INTRAVENOUS at 10:05

## 2023-05-07 VITALS
TEMPERATURE: 98 F | DIASTOLIC BLOOD PRESSURE: 69 MMHG | RESPIRATION RATE: 25 BRPM | BODY MASS INDEX: 27.44 KG/M2 | OXYGEN SATURATION: 96 % | WEIGHT: 170 LBS | SYSTOLIC BLOOD PRESSURE: 138 MMHG | HEART RATE: 95 BPM

## 2023-05-07 LAB
ALBUMIN SERPL BCP-MCNC: 3.9 G/DL (ref 3.5–5.2)
ALP SERPL-CCNC: 70 U/L (ref 55–135)
ALT SERPL W/O P-5'-P-CCNC: 23 U/L (ref 10–44)
ANION GAP SERPL CALC-SCNC: 17 MMOL/L (ref 8–16)
AST SERPL-CCNC: 31 U/L (ref 10–40)
BILIRUB SERPL-MCNC: 0.2 MG/DL (ref 0.1–1)
BUN SERPL-MCNC: 11 MG/DL (ref 6–20)
CALCIUM SERPL-MCNC: 8.8 MG/DL (ref 8.7–10.5)
CHLORIDE SERPL-SCNC: 113 MMOL/L (ref 95–110)
CO2 SERPL-SCNC: 14 MMOL/L (ref 23–29)
CREAT SERPL-MCNC: 0.7 MG/DL (ref 0.5–1.4)
EST. GFR  (NO RACE VARIABLE): >60 ML/MIN/1.73 M^2
GLUCOSE SERPL-MCNC: 84 MG/DL (ref 70–110)
POTASSIUM SERPL-SCNC: 3.5 MMOL/L (ref 3.5–5.1)
PROT SERPL-MCNC: 7.1 G/DL (ref 6–8.4)
SODIUM SERPL-SCNC: 144 MMOL/L (ref 136–145)

## 2023-05-07 NOTE — ED TRIAGE NOTES
Ivonne Arevalo, an 58 y.o. female presents to the ED via EMS. Pt sustained an unwitnessed fall in a kitchen where family members suspect she hit the back of her head on the countertop. No swelling or bleeding noted to alleged affected area. Pt doesn't remember falling. +ETOH. Pt is only oriented to person      Chief Complaint   Patient presents with    Fall     Ground level trip and fall with +LOC. Denies blood thinner use. +ETOH use.Pt AAOx4 upon arrival      Review of patient's allergies indicates:  No Known Allergies  Past Medical History:   Diagnosis Date    Basal cell cancer     followed by Dr Burnett    Family history of diabetes mellitus (DM)     Family history of early CAD     Mild hyperlipidemia     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 Burnett (excision)    Stress incontinence

## 2023-05-07 NOTE — PROVIDER PROGRESS NOTES - EMERGENCY DEPT.
Encounter Date: 5/6/2023    ED Physician Progress Notes        ED Course: I, Luda Chavez MD have assumed care of this patient from Dr. Alba . Patient is a history of hypothyroidism and hyperlipidemia presenting with altered mental status and recent fall.  Patient accompanied by family members at bedside endorses drinking alcohol earlier today.  Upon sign-out patient pending reassessment and imaging.  Workup noted for unremarkable imaging patient at mental baseline A&O x4 walking with stable gait clinically sober at baseline as per patient's family at bedside safe for plan discharge home.      Medications given in the ED:    Medications   sodium chloride 0.9% bolus 1,000 mL 1,000 mL (0 mLs Intravenous Stopped 5/7/23 0018)   LORazepam injection 1 mg (1 mg Intravenous Given 5/6/23 9420)       Disposition: d/c home      Impression: falll, etoh intox

## 2023-05-07 NOTE — ED PROVIDER NOTES
"Encounter Date: 5/6/2023       History     Chief Complaint   Patient presents with    Fall     Ground level trip and fall with +LOC. Denies blood thinner use. +ETOH use.Pt AAOx4 upon arrival      58-year-old female with past medical history of hypothyroidism on levothyroxine and hyperlipidemia now presenting for evaluation following a fall with head injury and altered mental status.  Patient's  is bedside and reports that patient had "a lot to drink today" and when walking across her kitchen she fell backwards and hit the back side of her head.  Patient did not lose consciousness but had worsening altered mental status and was breathing very heavily and  decided to call EMS.  As per EMS patient had normal vital signs during transfer.    In emergency department, patient is uncooperative and is not oriented to time or place and does not give adequate history.    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 Burnett (excision)    Stress incontinence      Past Surgical History:   Procedure Laterality Date    BONE MARROW BIOPSY Left 6/28/2018    Procedure: Biopsy-bone marrow;  Surgeon: Saul Freitas MD;  Location: [facility] OR [address] [place] [address];  Service: Oncology;  Laterality: Left;    CERVICAL DISC ARTHROPLASTY  2008    ENDOMETRIAL ABLATION      ESOPHAGOGASTRODUODENOSCOPY N/A 3/27/2023    Procedure: ESOPHAGOGASTRODUODENOSCOPY (EGD);  Surgeon: Mundo George MD;  Location: [facility] ENDOSCOPY;  Service: Endoscopy;  Laterality: N/A;  Instructions to portal.EC  3/24 pre-call complete, pt stated she would have a ride -CE    hammertoe surgery  4/26/15    right    TOE SURGERY Right     TOTAL SHOULDER ARTHROPLASTY  2008     Family History   Problem " Relation Age of Onset    Depression Mother     Hypertension Mother     Heart disease Mother     Stroke Mother     Alzheimer's disease Mother     Diabetes Father     Heart disease Father     Hypertension Father     Kidney disease Father     Cataracts Father     Cirrhosis Father     Hyperlipidemia Sister     Arthritis Brother     Coronary artery disease Brother 42             Hypertension Brother     Heart attack Brother     Heart disease Brother     Heart disease Maternal Aunt     Hypertension Maternal Aunt     Cancer Maternal Uncle         unknown primary    Heart disease Maternal Uncle     Hypertension Maternal Uncle     Arthritis Maternal Grandmother     Diabetes Maternal Grandmother     Heart disease Maternal Grandmother     Hypertension Maternal Grandmother     Stroke Sister 50    Coronary artery disease Sister 56    Hyperlipidemia Sister     Diabetes Paternal Grandmother     Heart disease Paternal Grandmother     Amblyopia Neg Hx     Blindness Neg Hx     Glaucoma Neg Hx     Macular degeneration Neg Hx     Strabismus Neg Hx     Retinal detachment Neg Hx      Social History     Tobacco Use    Smoking status: Never    Smokeless tobacco: Never   Substance Use Topics    Alcohol use: Yes     Alcohol/week: 2.0 standard drinks     Types: 2 Glasses of wine per week    Drug use: No     Review of Systems    Physical Exam     Initial Vitals [05/06/23 2151]   BP Pulse Resp Temp SpO2   132/82 88 18 98.3 °F (36.8 °C) 99 %      MAP       --         Physical Exam    Nursing note and vitals reviewed.    Gen:  Awake and alert but not oriented to time replace, well nourished, appears stated age, no pallor, no jaundice, appears well hydrated  Eye: EOMI, no scleral icterus, no periorbital edema or ecchymosis  Head: Normocephalic, atraumatic, no lesions, scalp appears normal  ENT: Neck supple, no stridor, no masses, no drooling or voice changes  CVS: All distal pulses intact with normal rate and rhythm, no JVD, normal S1/S2, no  murmur  Pulm:  Resp rate 26 Normal breath sounds, no wheezes, rales or rhonchi,   Abd:  Nondistended, soft, nontender, no organomegaly, no CVAT  Ext: No edema, no lesions, rashes, or deformity  Neuro: GCS14 (E4V4M6), cranial nerves intact, pupils equal and reactive to light, power 4-5 of bilateral upper limbs, power 3/5 of bilateral lower limbs, tone/sensation intact globally bilaterally.  3+ bilateral patellar reflexes.  Psych:  Unable to assess given encephalopathy    ED Course   Procedures  Labs Reviewed   CBC W/ AUTO DIFFERENTIAL - Abnormal; Notable for the following components:       Result Value    MCH 32.7 (*)     Platelets 467 (*)     All other components within normal limits   ALCOHOL,MEDICAL (ETHANOL) - Abnormal; Notable for the following components:    Alcohol, Serum 150 (*)     All other components within normal limits   TSH   COMPREHENSIVE METABOLIC PANEL          Imaging Results              X-Ray Chest AP Portable (Final result)  Result time 05/06/23 23:24:14      Final result by Lake Bill MD (05/06/23 23:24:14)                   Impression:      No acute findings.    No significant change from prior study.      Electronically signed by: Lake Bill MD  Date:    05/06/2023  Time:    23:24               Narrative:    EXAMINATION:  XR CHEST AP PORTABLE    CLINICAL HISTORY:  Unspecified fall, initial encounter    TECHNIQUE:  Single frontal view of the chest was performed.    COMPARISON:  10/21/2021.    FINDINGS:  Fixation hardware overlies the lower cervical spine.    Heart and lungs  appear unchanged when allowing for differences in technique and positioning.                                       Medications   sodium chloride 0.9% bolus 1,000 mL 1,000 mL (0 mLs Intravenous Stopped 5/7/23 0018)   LORazepam injection 1 mg (1 mg Intravenous Given 5/6/23 7831)     Medical Decision Making:   Initial Assessment:   58-year-old female presenting with altered mental status following a fall. Patient  is able to speak, breathing spontaneously, hemodynamically stable, oriented, moving all 4 limbs spontaneously.  Examination consistent with encephalopathy  Differential Diagnosis:   Initial impression concerning for  Ethanol intoxication causing mechanical fall  Considered intracranial bleeding/CVA  Pneumothorax  Electrolyte imbalance  Considered fractures/dislocations secondary to fall  Clinical Tests:   Lab Tests: Ordered and Reviewed  Radiological Study: Ordered and Reviewed  ED Management:  On presentation patient appeared very anxious with rapid breathing and concern for pneumothorax given fall, however patient was saturating 100% on room air and had clear bilateral breath sounds.  Patient was uncooperative on examination and gave inappropriate responses to questions and was not oriented to time or place.  Neurological examination was consistent with decreased power of bilateral limbs with intact tone and reflexes.  High suspicion for ethanol intoxication causing altered mental status in addition to anxiety.  Decided to give patient 1 mg of Ativan which improved her rapid breathing.  CT head and CT C-spine were ordered given fall in setting of altered mental status.  CBC was benign.  Ethanol level is 150.  TSH was 3.95.  Patient was signed out to oncoming team pending CMP, chest x-ray, CT head/C-spine.  Patient remained stable in the emergency department with ongoing altered mental status.           ED Course as of 05/07/23 0025   Sat May 06, 2023   2319 WBC: 9.56 [PM]   2319 Hemoglobin: 15.8 [PM]   2319 Platelets(!): 467 [PM]   Sun May 07, 2023   0024 TSH: 3.954 [PM]   0025 Alcohol, Serum(!): 150 [PM]      ED Course User Index  [PM] Rich Phelan MD                 Clinical Impression:   Final diagnoses:  [W19.XXXA] Fall  [R41.82] Altered mental status, unspecified altered mental status type (Primary)               Rich Phelan MD  Resident  05/07/23 0025

## 2023-08-01 DIAGNOSIS — R05.9 COUGH: ICD-10-CM

## 2023-08-02 RX ORDER — MONTELUKAST SODIUM 10 MG/1
TABLET ORAL
Qty: 90 TABLET | Refills: 0 | Status: SHIPPED | OUTPATIENT
Start: 2023-08-02 | End: 2024-01-08

## 2023-08-02 NOTE — TELEPHONE ENCOUNTER
Care Due:                  Date            Visit Type   Department     Provider  --------------------------------------------------------------------------------                                EP -                              PRIMARY      [facility] INTERNAL  Shari Salinas  Last Visit: 10-      CARE ([facility])   MEDICINE       [place]  Next Visit: None Scheduled  None         None Found                                                            Last  Test          Frequency    Reason                     Performed    Due Date  --------------------------------------------------------------------------------    Office Visit  12 months..  levothyroxine,             10-   10-                             montelukast, omeprazole..    Health [facility] Embedded Care Due Messages. Reference number: [id number].   8/01/2023 8:47:33 PM CDT

## 2023-08-02 NOTE — TELEPHONE ENCOUNTER
Provider Staff:  Action required. This patient has received emergency care.     Please schedule patient for a follow up appointment.     Thanks!  [facility]     Appointments      Date Provider   Last Visit   10/25/2022 Shari Holman MD   Next Visit   Visit date not found Shari Holman MD          Refill Decision Note   Ivonne Arevalo  is requesting a refill authorization.  Brief Assessment and Rationale for Refill:  Approve     Medication Therapy Plan:  ED notes review. no change in therapy to montelukast.      Extended chart review required: Yes   Comments:     Note composed:8:44 AM 08/02/2023

## 2023-10-04 ENCOUNTER — PATIENT MESSAGE (OUTPATIENT)
Dept: INTERNAL MEDICINE | Facility: CLINIC | Age: 58
End: 2023-10-04
Payer: COMMERCIAL

## 2023-10-04 DIAGNOSIS — Z00.00 ANNUAL PHYSICAL EXAM: Primary | ICD-10-CM

## 2023-10-24 ENCOUNTER — LAB VISIT (OUTPATIENT)
Dept: LAB | Facility: HOSPITAL | Age: 58
End: 2023-10-24
Attending: INTERNAL MEDICINE
Payer: COMMERCIAL

## 2023-10-24 DIAGNOSIS — E78.2 MIXED HYPERLIPIDEMIA: ICD-10-CM

## 2023-10-24 DIAGNOSIS — Z00.00 ANNUAL PHYSICAL EXAM: ICD-10-CM

## 2023-10-24 LAB
ALBUMIN SERPL BCP-MCNC: 4.7 G/DL (ref 3.5–5.2)
ALP SERPL-CCNC: 85 U/L (ref 55–135)
ALT SERPL W/O P-5'-P-CCNC: 24 U/L (ref 10–44)
ANION GAP SERPL CALC-SCNC: 15 MMOL/L (ref 8–16)
AST SERPL-CCNC: 29 U/L (ref 10–40)
BASOPHILS # BLD AUTO: 0.06 K/UL (ref 0–0.2)
BASOPHILS NFR BLD: 0.7 % (ref 0–1.9)
BILIRUB SERPL-MCNC: 0.5 MG/DL (ref 0.1–1)
BUN SERPL-MCNC: 17 MG/DL (ref 6–20)
CALCIUM SERPL-MCNC: 10.6 MG/DL (ref 8.7–10.5)
CHLORIDE SERPL-SCNC: 103 MMOL/L (ref 95–110)
CHOLEST SERPL-MCNC: 190 MG/DL (ref 120–199)
CHOLEST/HDLC SERPL: 3.9 {RATIO} (ref 2–5)
CO2 SERPL-SCNC: 20 MMOL/L (ref 23–29)
CREAT SERPL-MCNC: 1 MG/DL (ref 0.5–1.4)
DIFFERENTIAL METHOD: ABNORMAL
EOSINOPHIL # BLD AUTO: 0.1 K/UL (ref 0–0.5)
EOSINOPHIL NFR BLD: 0.6 % (ref 0–8)
ERYTHROCYTE [DISTWIDTH] IN BLOOD BY AUTOMATED COUNT: 11.3 % (ref 11.5–14.5)
EST. GFR  (NO RACE VARIABLE): >60 ML/MIN/1.73 M^2
ESTIMATED AVG GLUCOSE: 100 MG/DL (ref 68–131)
FOLATE SERPL-MCNC: 11.2 NG/ML (ref 4–24)
GLUCOSE SERPL-MCNC: 106 MG/DL (ref 70–110)
HBA1C MFR BLD: 5.1 % (ref 4–5.6)
HCT VFR BLD AUTO: 46.5 % (ref 37–48.5)
HDLC SERPL-MCNC: 49 MG/DL (ref 40–75)
HDLC SERPL: 25.8 % (ref 20–50)
HGB BLD-MCNC: 15.7 G/DL (ref 12–16)
IMM GRANULOCYTES # BLD AUTO: 0.03 K/UL (ref 0–0.04)
IMM GRANULOCYTES NFR BLD AUTO: 0.3 % (ref 0–0.5)
LDLC SERPL CALC-MCNC: 85.6 MG/DL (ref 63–159)
LYMPHOCYTES # BLD AUTO: 2.4 K/UL (ref 1–4.8)
LYMPHOCYTES NFR BLD: 27.3 % (ref 18–48)
MCH RBC QN AUTO: 32.1 PG (ref 27–31)
MCHC RBC AUTO-ENTMCNC: 33.8 G/DL (ref 32–36)
MCV RBC AUTO: 95 FL (ref 82–98)
MONOCYTES # BLD AUTO: 0.7 K/UL (ref 0.3–1)
MONOCYTES NFR BLD: 8.2 % (ref 4–15)
NEUTROPHILS # BLD AUTO: 5.5 K/UL (ref 1.8–7.7)
NEUTROPHILS NFR BLD: 62.9 % (ref 38–73)
NONHDLC SERPL-MCNC: 141 MG/DL
NRBC BLD-RTO: 0 /100 WBC
PLATELET # BLD AUTO: 433 K/UL (ref 150–450)
PMV BLD AUTO: 9.4 FL (ref 9.2–12.9)
POTASSIUM SERPL-SCNC: 4.2 MMOL/L (ref 3.5–5.1)
PROT SERPL-MCNC: 8.6 G/DL (ref 6–8.4)
RBC # BLD AUTO: 4.89 M/UL (ref 4–5.4)
SODIUM SERPL-SCNC: 138 MMOL/L (ref 136–145)
T4 FREE SERPL-MCNC: 0.97 NG/DL (ref 0.71–1.51)
TRIGL SERPL-MCNC: 277 MG/DL (ref 30–150)
TSH SERPL DL<=0.005 MIU/L-ACNC: 6.85 UIU/ML (ref 0.4–4)
WBC # BLD AUTO: 8.74 K/UL (ref 3.9–12.7)

## 2023-10-24 PROCEDURE — 84443 ASSAY THYROID STIM HORMONE: CPT | Performed by: INTERNAL MEDICINE

## 2023-10-24 PROCEDURE — 85025 COMPLETE CBC W/AUTO DIFF WBC: CPT | Performed by: INTERNAL MEDICINE

## 2023-10-24 PROCEDURE — 80061 LIPID PANEL: CPT | Performed by: INTERNAL MEDICINE

## 2023-10-24 PROCEDURE — 84439 ASSAY OF FREE THYROXINE: CPT | Performed by: INTERNAL MEDICINE

## 2023-10-24 PROCEDURE — 80053 COMPREHEN METABOLIC PANEL: CPT | Performed by: INTERNAL MEDICINE

## 2023-10-24 PROCEDURE — 84425 ASSAY OF VITAMIN B-1: CPT | Performed by: INTERNAL MEDICINE

## 2023-10-24 PROCEDURE — 83036 HEMOGLOBIN GLYCOSYLATED A1C: CPT | Performed by: INTERNAL MEDICINE

## 2023-10-24 PROCEDURE — 82746 ASSAY OF FOLIC ACID SERUM: CPT | Performed by: INTERNAL MEDICINE

## 2023-10-30 LAB — VIT B1 BLD-MCNC: 118 UG/L (ref 38–122)

## 2023-11-01 ENCOUNTER — LAB VISIT (OUTPATIENT)
Dept: LAB | Facility: OTHER | Age: 58
End: 2023-11-01
Attending: INTERNAL MEDICINE
Payer: COMMERCIAL

## 2023-11-01 ENCOUNTER — OFFICE VISIT (OUTPATIENT)
Dept: INTERNAL MEDICINE | Facility: CLINIC | Age: 58
End: 2023-11-01
Attending: INTERNAL MEDICINE
Payer: COMMERCIAL

## 2023-11-01 VITALS
OXYGEN SATURATION: 99 % | HEART RATE: 97 BPM | BODY MASS INDEX: 26.76 KG/M2 | WEIGHT: 165.81 LBS | DIASTOLIC BLOOD PRESSURE: 80 MMHG | SYSTOLIC BLOOD PRESSURE: 126 MMHG

## 2023-11-01 DIAGNOSIS — E83.52 HYPERCALCEMIA: ICD-10-CM

## 2023-11-01 DIAGNOSIS — K22.2 SCHATZKI'S RING OF DISTAL ESOPHAGUS: ICD-10-CM

## 2023-11-01 DIAGNOSIS — Z12.31 ENCOUNTER FOR SCREENING MAMMOGRAM FOR MALIGNANT NEOPLASM OF BREAST: ICD-10-CM

## 2023-11-01 DIAGNOSIS — E78.2 MIXED HYPERLIPIDEMIA: ICD-10-CM

## 2023-11-01 DIAGNOSIS — E03.9 HYPOTHYROIDISM, UNSPECIFIED TYPE: ICD-10-CM

## 2023-11-01 DIAGNOSIS — K21.9 GASTROESOPHAGEAL REFLUX DISEASE, UNSPECIFIED WHETHER ESOPHAGITIS PRESENT: ICD-10-CM

## 2023-11-01 DIAGNOSIS — Z00.00 ANNUAL PHYSICAL EXAM: Primary | ICD-10-CM

## 2023-11-01 DIAGNOSIS — Z79.899 LONG-TERM USE OF HIGH-RISK MEDICATION: ICD-10-CM

## 2023-11-01 LAB
25(OH)D3+25(OH)D2 SERPL-MCNC: 42 NG/ML (ref 30–96)
ANION GAP SERPL CALC-SCNC: 10 MMOL/L (ref 8–16)
BUN SERPL-MCNC: 16 MG/DL (ref 6–20)
CALCIUM SERPL-MCNC: 10.7 MG/DL (ref 8.7–10.5)
CHLORIDE SERPL-SCNC: 103 MMOL/L (ref 95–110)
CO2 SERPL-SCNC: 26 MMOL/L (ref 23–29)
CREAT SERPL-MCNC: 0.9 MG/DL (ref 0.5–1.4)
EST. GFR  (NO RACE VARIABLE): >60 ML/MIN/1.73 M^2
GLUCOSE SERPL-MCNC: 94 MG/DL (ref 70–110)
POTASSIUM SERPL-SCNC: 4.6 MMOL/L (ref 3.5–5.1)
PTH-INTACT SERPL-MCNC: 20.8 PG/ML (ref 9–77)
SODIUM SERPL-SCNC: 139 MMOL/L (ref 136–145)

## 2023-11-01 PROCEDURE — 99396 PR PREVENTIVE VISIT,EST,40-64: ICD-10-PCS | Mod: S$GLB,,, | Performed by: INTERNAL MEDICINE

## 2023-11-01 PROCEDURE — 36415 COLL VENOUS BLD VENIPUNCTURE: CPT | Performed by: INTERNAL MEDICINE

## 2023-11-01 PROCEDURE — 3079F DIAST BP 80-89 MM HG: CPT | Mod: CPTII,S$GLB,, | Performed by: INTERNAL MEDICINE

## 2023-11-01 PROCEDURE — 1159F MED LIST DOCD IN RCRD: CPT | Mod: CPTII,S$GLB,, | Performed by: INTERNAL MEDICINE

## 2023-11-01 PROCEDURE — 99213 PR OFFICE/OUTPT VISIT, EST, LEVL III, 20-29 MIN: ICD-10-PCS | Mod: 25,S$GLB,, | Performed by: INTERNAL MEDICINE

## 2023-11-01 PROCEDURE — 80048 BASIC METABOLIC PNL TOTAL CA: CPT | Performed by: INTERNAL MEDICINE

## 2023-11-01 PROCEDURE — 83970 ASSAY OF PARATHORMONE: CPT | Performed by: INTERNAL MEDICINE

## 2023-11-01 PROCEDURE — 99999 PR PBB SHADOW E&M-EST. PATIENT-LVL III: ICD-10-PCS | Mod: PBBFAC,,, | Performed by: INTERNAL MEDICINE

## 2023-11-01 PROCEDURE — 99999 PR PBB SHADOW E&M-EST. PATIENT-LVL III: CPT | Mod: PBBFAC,,, | Performed by: INTERNAL MEDICINE

## 2023-11-01 PROCEDURE — 3074F PR MOST RECENT SYSTOLIC BLOOD PRESSURE < 130 MM HG: ICD-10-PCS | Mod: CPTII,S$GLB,, | Performed by: INTERNAL MEDICINE

## 2023-11-01 PROCEDURE — 1159F PR MEDICATION LIST DOCUMENTED IN MEDICAL RECORD: ICD-10-PCS | Mod: CPTII,S$GLB,, | Performed by: INTERNAL MEDICINE

## 2023-11-01 PROCEDURE — 3044F HG A1C LEVEL LT 7.0%: CPT | Mod: CPTII,S$GLB,, | Performed by: INTERNAL MEDICINE

## 2023-11-01 PROCEDURE — 3008F PR BODY MASS INDEX (BMI) DOCUMENTED: ICD-10-PCS | Mod: CPTII,S$GLB,, | Performed by: INTERNAL MEDICINE

## 2023-11-01 PROCEDURE — 3079F PR MOST RECENT DIASTOLIC BLOOD PRESSURE 80-89 MM HG: ICD-10-PCS | Mod: CPTII,S$GLB,, | Performed by: INTERNAL MEDICINE

## 2023-11-01 PROCEDURE — 99213 OFFICE O/P EST LOW 20 MIN: CPT | Mod: 25,S$GLB,, | Performed by: INTERNAL MEDICINE

## 2023-11-01 PROCEDURE — 3074F SYST BP LT 130 MM HG: CPT | Mod: CPTII,S$GLB,, | Performed by: INTERNAL MEDICINE

## 2023-11-01 PROCEDURE — 99396 PREV VISIT EST AGE 40-64: CPT | Mod: S$GLB,,, | Performed by: INTERNAL MEDICINE

## 2023-11-01 PROCEDURE — 3044F PR MOST RECENT HEMOGLOBIN A1C LEVEL <7.0%: ICD-10-PCS | Mod: CPTII,S$GLB,, | Performed by: INTERNAL MEDICINE

## 2023-11-01 PROCEDURE — 3008F BODY MASS INDEX DOCD: CPT | Mod: CPTII,S$GLB,, | Performed by: INTERNAL MEDICINE

## 2023-11-01 PROCEDURE — 82306 VITAMIN D 25 HYDROXY: CPT | Performed by: INTERNAL MEDICINE

## 2023-11-01 RX ORDER — MINOXIDIL 2.5 MG/1
2.5 TABLET ORAL DAILY
COMMUNITY

## 2023-11-01 NOTE — PROGRESS NOTES
Subjective:       Patient ID: Ivonne Arevalo is a 58 y.o. female.    Chief Complaint: Annual Exam     Ivonne Arevalo is a 58 y.o.  female who presents for Annual Exam  .    Patient has hyperlipidemia. Pt is complaint with risk reduction medication. Denies muscle cramping and weakness. Pt attempts to follow a low cholesterol diet and exercise. No CP, SOB, orthopnea or PND.     Walks to the bank, post office etc.   Started a wt training program 20 mins twice a week and reports significant improvements in strength balance etc.. Has been inconsistent recently.        Patient Active Problem List   Diagnosis    Mixed hyperlipidemia    Stress incontinence    Basal cell cancer    Family history of early CAD    Family history of diabetes mellitus (DM)    Hypertriglyceridemia    Hypothyroid    Herpes zoster of eyelid    Gastroesophageal reflux disease with esophagitis    Atypical chest pain    Thrombocytosis    Squamous cell carcinoma of arm    Polyp of colon    Chronic cough    Schatzki's ring of distal esophagus    Hypercalcemia    Gastroesophageal reflux disease           Past Medical History:   Diagnosis Date    Basal cell cancer     followed by Dr Burnett    Family history of diabetes mellitus (DM)     Family history of early CAD     Mild hyperlipidemia     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 Burnett (excision)    Stress incontinence        Past Surgical History:   Procedure Laterality Date    BONE MARROW BIOPSY Left 6/28/2018    Procedure: Biopsy-bone marrow;  Surgeon: Saul Freitas MD;  Location: [facility] OR [address] [place] [address];  Service: Oncology;  Laterality: Left;    CERVICAL DISC ARTHROPLASTY  2008    ENDOMETRIAL ABLATION      ESOPHAGOGASTRODUODENOSCOPY N/A 3/27/2023    Procedure: ESOPHAGOGASTRODUODENOSCOPY (EGD);  Surgeon: Mundo George MD;  Location: [facility] ENDOSCOPY;  Service: Endoscopy;  Laterality: N/A;  Instructions to portal.  3/24 pre-call complete, pt stated she would have a ride -CE     [facility]    right    TOE SURGERY Right     TOTAL SHOULDER ARTHROPLASTY  2008       Family History   Problem Relation Age of Onset    Depression Mother     Hypertension Mother     Heart disease Mother     Stroke Mother     Alzheimer's disease Mother     Diabetes Father     Heart disease Father     Hypertension Father     Kidney disease Father     Cataracts Father     Cirrhosis Father     Hyperlipidemia Sister     Arthritis Brother     Coronary artery disease Brother 42             Hypertension Brother     Heart attack Brother     Heart disease Brother     Heart disease Maternal Aunt     Hypertension Maternal Aunt     Cancer Maternal Uncle         unknown primary    Heart disease Maternal Uncle     Hypertension Maternal Uncle     Arthritis Maternal Grandmother     Diabetes Maternal Grandmother     Heart disease Maternal Grandmother     Hypertension Maternal Grandmother     Stroke Sister 50    Coronary artery disease Sister 56    Hyperlipidemia Sister     Diabetes Paternal Grandmother     Heart disease Paternal Grandmother     Amblyopia Neg Hx     Blindness Neg Hx     Glaucoma Neg Hx     Macular degeneration Neg Hx     Strabismus Neg Hx     Retinal detachment Neg Hx        Social History     Tobacco Use    Smoking status: Never    Smokeless tobacco: Never   Substance Use Topics    Alcohol use: Yes     Alcohol/week: 2.0 standard drinks of alcohol     Types: 2 Glasses of wine per week    Drug use: No         Review of Systems   Constitutional:  Negative for chills and fever.   Respiratory:  Negative for cough and shortness of breath.    Cardiovascular:  Negative for chest pain and palpitations.   Gastrointestinal:  Negative for nausea and vomiting.   Genitourinary:  Negative for dysuria and hematuria.         Objective:   Blood pressure 126/80, pulse 97, weight 75.2 kg (165 lb 12.6 oz), SpO2 99 %.     Physical Exam  Constitutional:       Appearance: Normal appearance. She is well-developed. She is not  ill-appearing.   HENT:      Head: Normocephalic and atraumatic.   Eyes:      General: No scleral icterus.     Conjunctiva/sclera: Conjunctivae normal.   Cardiovascular:      Rate and Rhythm: Normal rate.      Heart sounds: Normal heart sounds. No murmur heard.     No friction rub. No gallop.   Pulmonary:      Effort: Pulmonary effort is normal.      Breath sounds: Normal breath sounds. No wheezing or rales.   Chest:      Chest wall: No tenderness.   Abdominal:      General: Bowel sounds are normal.      Palpations: Abdomen is soft.   Musculoskeletal:         General: No tenderness or signs of injury.   Skin:     General: Skin is warm and dry.   Neurological:      Mental Status: She is alert and oriented to person, place, and time. Mental status is at baseline.   Psychiatric:         Behavior: Behavior normal.         Thought Content: Thought content normal.         Prior labs reviewed    Health Maintenance         Date Due Completion Date    Cervical Cancer Screening 04/26/2019 4/26/2016    Override on 11/14/2013: Done    Influenza Vaccine (1) 09/01/2023 12/3/2020    COVID-19 Vaccine (3 - 2023-24 season) 09/01/2023 7/22/2021    Mammogram 12/29/2023 12/29/2022    Override on 12/18/2017: Done (done at [facility])    Override on 12/14/2016: Done (at [facility])    Override on 11/11/2015: Done    Override on 10/13/2014: Done    Override on 11/14/2013: Done    Pneumococcal Vaccines (Age 0-64) (1 - PCV) 01/31/2030 (Originally 1/31/1971) ---    Colorectal Cancer Screening 08/05/2026 8/5/2021    Override on 4/21/2016: Declined (scheduled may 3rd with dr salgado)    Hemoglobin A1c (Diabetic Prevention Screening) 10/24/2026 10/24/2023    Lipid Panel 10/24/2028 10/24/2023    TETANUS VACCINE 07/15/2029 7/15/2019            Assessment/Plan:       1. Annual physical exam  -     Magnesium; Future; Expected date: 10/31/2024  -     Vitamin B12; Future; Expected date: 10/31/2024  -     Vitamin D; Future; Expected date:  10/31/2024  -     Lipid Panel; Future; Expected date: 10/31/2024  -     Comprehensive Metabolic Panel; Future; Expected date: 10/31/2024  -     Hemoglobin A1C; Future; Expected date: 10/31/2024  Recommend daily sunscreen, cardiovascular exercise min 30 min 5 days per week. Seatbelts routinely.    Recommend monthly self breast exams and annual mammogram OVER AGE 40 or as recommended.  Also screening  pap with reflex HPV q 3 years or as recommended by gyn provider.           8. Mixed hyperlipidemia  Followed by cardiology  She will follow up for recs from cards  Rec decreasing etOH and simple carbs and sugars    CC: abnormal labs  HPI: Tsh mildly elevated, T4 low normal but basically unchanged. Hx of hypothuyroidism treated with synthroid 50 mcg daily Pt reports no changes in symptoms. Prefers to remain on current dose.  Calcium elevated, not taking calcium supplements. Does not take vitamin d. Recently started chronic omprazole for GERD, schatzchi's ring dilated. Bx neg. No longer suffering with chronic cough when taking omprazole. No hx of cancer. No claudia pain.     PE: no thyromegally, no thryoid tenderness     2. Hypothyroidism, unspecified type  Overview:  Controlled with levothyroxine 50 mg  11/23 tsh mildly elevated, asymptomatic, cont current dose  3. Hypercalcemia  Overview:  Mild, repeat labs with vit d pthi    Orders:  -     Basic Metabolic Panel; Future; Expected date: 11/01/2023  -     Vitamin D; Future; Expected date: 11/01/2023  -     PTH, intact; Future; Expected date: 11/01/2023    4. Schatzki's ring of distal esophagus  Overview:  Dilated on egd 2023, on omeprazole 20 mg indefintely  Currently asymptomatic    5. Gastroesophageal reflux disease, unspecified whether esophagitis present  Controlled with omeprazole 20 mg daily    6. Encounter for screening mammogram for malignant neoplasm of breast  -     Mammo Digital Screening Bilat w/ [place]; Future; Expected date: 12/30/2023    7. Long-term use of  high-risk medication  Check vit d, mag and b12 annually    Medication List with Changes/Refills   Current Medications    ASPIRIN (ECOTRIN) 81 MG EC TABLET    Take 81 mg by mouth once daily.    EZETIMIBE-SIMVASTATIN 10-20 MG (VYTORIN) 10-20 MG PER TABLET    Take 1 tablet by mouth every evening.    FINASTERIDE (PROSCAR) 5 MG TABLET    Take 5 mg by mouth once daily.    LEVOTHYROXINE (SYNTHROID) 50 MCG TABLET    Take 1 tablet (50 mcg total) by mouth once daily.    MINOXIDIL (LONITEN) 2.5 MG TABLET    Take 2.5 mg by mouth once daily.    MONTELUKAST (SINGULAIR) 10 MG TABLET    TAKE 1 TABLET BY MOUTH EVERY DAY IN THE EVENING    OMEPRAZOLE (PRILOSEC) 20 MG CAPSULE    TAKE 1 CAPSULE BY MOUTH EVERY DAY    SPIRONOLACTONE (ALDACTONE) 100 MG TABLET    Take 200 mg by mouth once daily.       Recommend patient complete vaccinations as listed in the overdue health maintenance report. Pt may obtain vaccinations at their local pharmacy, any [facility] pharmacy or request vaccination in our clinic.  Please note that some insurances will only cover vaccination cost at specific locations.Please check with your insurance provider regarding your coverage.  Vaccines that are not covered are still recommended.

## 2023-11-09 ENCOUNTER — PATIENT MESSAGE (OUTPATIENT)
Dept: CARDIOLOGY | Facility: HOSPITAL | Age: 58
End: 2023-11-09
Payer: COMMERCIAL

## 2023-11-15 ENCOUNTER — PATIENT MESSAGE (OUTPATIENT)
Dept: INTERNAL MEDICINE | Facility: CLINIC | Age: 58
End: 2023-11-15
Payer: COMMERCIAL

## 2023-11-15 DIAGNOSIS — K21.9 GASTROESOPHAGEAL REFLUX DISEASE, UNSPECIFIED WHETHER ESOPHAGITIS PRESENT: ICD-10-CM

## 2023-11-15 RX ORDER — OMEPRAZOLE 20 MG/1
20 CAPSULE, DELAYED RELEASE ORAL DAILY
Qty: 90 CAPSULE | Refills: 3 | Status: SHIPPED | OUTPATIENT
Start: 2023-11-15

## 2023-11-15 NOTE — TELEPHONE ENCOUNTER
No care due was identified.  [facility] Embedded Care Due Messages. Reference number: [id number].   11/15/2023 12:31:06 PM CST

## 2023-11-16 ENCOUNTER — OFFICE VISIT (OUTPATIENT)
Dept: CARDIOLOGY | Facility: CLINIC | Age: 58
End: 2023-11-16
Payer: COMMERCIAL

## 2023-11-16 VITALS
WEIGHT: 167.56 LBS | BODY MASS INDEX: 26.93 KG/M2 | HEIGHT: 66 IN | SYSTOLIC BLOOD PRESSURE: 127 MMHG | HEART RATE: 92 BPM | DIASTOLIC BLOOD PRESSURE: 83 MMHG

## 2023-11-16 DIAGNOSIS — Z82.49 FAMILY HISTORY OF EARLY CAD: Primary | ICD-10-CM

## 2023-11-16 DIAGNOSIS — E78.2 MIXED HYPERLIPIDEMIA: ICD-10-CM

## 2023-11-16 DIAGNOSIS — E78.1 HYPERTRIGLYCERIDEMIA: ICD-10-CM

## 2023-11-16 PROCEDURE — 3044F PR MOST RECENT HEMOGLOBIN A1C LEVEL <7.0%: ICD-10-PCS | Mod: CPTII,S$GLB,, | Performed by: INTERNAL MEDICINE

## 2023-11-16 PROCEDURE — 3008F BODY MASS INDEX DOCD: CPT | Mod: CPTII,S$GLB,, | Performed by: INTERNAL MEDICINE

## 2023-11-16 PROCEDURE — 99214 OFFICE O/P EST MOD 30 MIN: CPT | Mod: 25,S$GLB,, | Performed by: INTERNAL MEDICINE

## 2023-11-16 PROCEDURE — 1160F RVW MEDS BY RX/DR IN RCRD: CPT | Mod: CPTII,S$GLB,, | Performed by: INTERNAL MEDICINE

## 2023-11-16 PROCEDURE — 99999 PR PBB SHADOW E&M-EST. PATIENT-LVL III: ICD-10-PCS | Mod: PBBFAC,,, | Performed by: INTERNAL MEDICINE

## 2023-11-16 PROCEDURE — 3074F PR MOST RECENT SYSTOLIC BLOOD PRESSURE < 130 MM HG: ICD-10-PCS | Mod: CPTII,S$GLB,, | Performed by: INTERNAL MEDICINE

## 2023-11-16 PROCEDURE — 1160F PR REVIEW ALL MEDS BY PRESCRIBER/CLIN PHARMACIST DOCUMENTED: ICD-10-PCS | Mod: CPTII,S$GLB,, | Performed by: INTERNAL MEDICINE

## 2023-11-16 PROCEDURE — 99999 PR PBB SHADOW E&M-EST. PATIENT-LVL III: CPT | Mod: PBBFAC,,, | Performed by: INTERNAL MEDICINE

## 2023-11-16 PROCEDURE — 3074F SYST BP LT 130 MM HG: CPT | Mod: CPTII,S$GLB,, | Performed by: INTERNAL MEDICINE

## 2023-11-16 PROCEDURE — 99214 PR OFFICE/OUTPT VISIT, EST, LEVL IV, 30-39 MIN: ICD-10-PCS | Mod: 25,S$GLB,, | Performed by: INTERNAL MEDICINE

## 2023-11-16 PROCEDURE — 3044F HG A1C LEVEL LT 7.0%: CPT | Mod: CPTII,S$GLB,, | Performed by: INTERNAL MEDICINE

## 2023-11-16 PROCEDURE — 3008F PR BODY MASS INDEX (BMI) DOCUMENTED: ICD-10-PCS | Mod: CPTII,S$GLB,, | Performed by: INTERNAL MEDICINE

## 2023-11-16 PROCEDURE — 1159F MED LIST DOCD IN RCRD: CPT | Mod: CPTII,S$GLB,, | Performed by: INTERNAL MEDICINE

## 2023-11-16 PROCEDURE — 93000 ELECTROCARDIOGRAM COMPLETE: CPT | Mod: S$GLB,,, | Performed by: INTERNAL MEDICINE

## 2023-11-16 PROCEDURE — 1159F PR MEDICATION LIST DOCUMENTED IN MEDICAL RECORD: ICD-10-PCS | Mod: CPTII,S$GLB,, | Performed by: INTERNAL MEDICINE

## 2023-11-16 PROCEDURE — 3079F DIAST BP 80-89 MM HG: CPT | Mod: CPTII,S$GLB,, | Performed by: INTERNAL MEDICINE

## 2023-11-16 PROCEDURE — 3079F PR MOST RECENT DIASTOLIC BLOOD PRESSURE 80-89 MM HG: ICD-10-PCS | Mod: CPTII,S$GLB,, | Performed by: INTERNAL MEDICINE

## 2023-11-16 PROCEDURE — 93000 EKG 12-LEAD: ICD-10-PCS | Mod: S$GLB,,, | Performed by: INTERNAL MEDICINE

## 2023-11-16 RX ORDER — EZETIMIBE AND SIMVASTATIN 10; 40 MG/1; MG/1
1 TABLET ORAL NIGHTLY
Qty: 90 TABLET | Refills: 3 | Status: SHIPPED | OUTPATIENT
Start: 2023-11-16 | End: 2024-11-15

## 2023-11-16 NOTE — PROGRESS NOTES
HISTORY:    59 yo F with a h/o hyperlipidemia, premature FH of CV ds, thrombocytosis with unremarkable mccrary, and hypothyroidism presenting for follow-up.    The patient denies any symptoms of chest pain, shortness of breath, or dyspnea on exertion. Active in her life averaging over 10k steps/day.     The patient denies any previous history of myocardial infarction, coronary artery disease, peripheral arterial disease, stroke, congestive heart failure, or cardiomyopathy.    Brother had an MI in his 40s and sister had a CVA in her 50s. Non-smoker.    Tolerates asa 81x1 and simvastatin-ezetimibe 20-10. We tried rosuvastatin 5x1 and she did not diffuse body cramping.     PHYSICAL EXAM:    Vitals:    11/16/23 0922   BP: 127/83   Pulse: 92     NAD, A+Ox3.  No jvd, no bruit.  RRR nml s1,s2. No murmurs.  CTA B no wheezes or crackles.  2+ B radial and 1+ B DP/PT. No edema.    LABS/STUDIES (imaging reviewed during clinic visit):    October 2023 hemoglobin 15.7 with MCV of 95.  Creatinine 0.9 with BUN of 16.  Albumin 4.7.  LDL 85 and HDL 49.  Triglycerides 277.  A1c normal.  TSH 6.8 with normal free T4.  2022  and HDL 39. Tgs 183.  ECG today NSR no Qs/STs  ASVANAH 2017 7.6 minutes on a high ramp protocol with no ischemia.  Normal TTE at baseline and with stress.      ASSESSMENT & PLAN:    1. Family history of early CAD    2. Hypertriglyceridemia    3. Mixed hyperlipidemia            Orders Placed This Encounter    ezetimibe-simvastatin 10-40 mg (VYTORIN) 10-40 mg per tablet            Hyperlipidemia with a +FH. Did not tolerate rosuvastatin 5. Tolerating simvastatin-ezetimibe 20-10. Will increase to 40-10x1.    Diet modifications and exercise discussed. Actually doing fairly well from an exercise standpoint. Diet poor evidenced by Tgs. Bps controlled. On spironolactone for acne and finasteride for hair loss.     Follow up in about 1 year (around 11/16/2024).    Myra Begum MD

## 2024-01-03 ENCOUNTER — HOSPITAL ENCOUNTER (OUTPATIENT)
Dept: RADIOLOGY | Facility: HOSPITAL | Age: 59
Discharge: HOME | End: 2024-01-03
Attending: INTERNAL MEDICINE
Payer: COMMERCIAL

## 2024-01-03 VITALS — WEIGHT: 162 LBS | BODY MASS INDEX: 26.15 KG/M2

## 2024-01-03 DIAGNOSIS — Z12.31 ENCOUNTER FOR SCREENING MAMMOGRAM FOR MALIGNANT NEOPLASM OF BREAST: ICD-10-CM

## 2024-01-03 PROCEDURE — 77067 SCR MAMMO BI INCL CAD: CPT | Mod: TC

## 2024-01-03 PROCEDURE — 77067 SCR MAMMO BI INCL CAD: CPT | Mod: 26,,, | Performed by: RADIOLOGY

## 2024-01-03 PROCEDURE — 77063 BREAST TOMOSYNTHESIS BI: CPT | Mod: 26,,, | Performed by: RADIOLOGY

## 2024-01-07 DIAGNOSIS — R05.9 COUGH: ICD-10-CM

## 2024-01-07 NOTE — TELEPHONE ENCOUNTER
No care due was identified.  Health [facility] Embedded Care Due Messages. Reference number: [id number].   1/07/2024 12:24:50 AM CST

## 2024-01-08 DIAGNOSIS — E03.9 HYPOTHYROIDISM, UNSPECIFIED TYPE: ICD-10-CM

## 2024-01-08 RX ORDER — MONTELUKAST SODIUM 10 MG/1
TABLET ORAL
Qty: 90 TABLET | Refills: 3 | Status: SHIPPED | OUTPATIENT
Start: 2024-01-08

## 2024-01-08 NOTE — TELEPHONE ENCOUNTER
No care due was identified.  Health [facility] Embedded Care Due Messages. Reference number: [id number].   1/08/2024 12:09:06 AM CST

## 2024-01-08 NOTE — TELEPHONE ENCOUNTER
Refill Decision Note   Ivonne Irina  is requesting a refill authorization.  Brief Assessment and Rationale for Refill:  Approve     Medication Therapy Plan:  all protocols met for 90 plus 3      Comments:     Note composed:12:29 PM 01/08/2024

## 2024-01-08 NOTE — TELEPHONE ENCOUNTER
Refill Routing Note   Medication(s) are not appropriate for processing by [facility] for the following reason(s):      Required labs abnormal    [facility] action(s):  Defer Care Due:  None identified            Appointments  past 12m or future 3m with PCP    Date Provider   Last Visit   11/1/2023 Shari Holman MD   Next Visit   Visit date not found Shari Holman MD   ED visits in past 90 days: 0        Note composed:5:14 PM 01/08/2024

## 2024-01-09 RX ORDER — LEVOTHYROXINE SODIUM 50 UG/1
50 TABLET ORAL
Qty: 90 TABLET | Refills: 3 | Status: SHIPPED | OUTPATIENT
Start: 2024-01-09

## 2024-06-10 ENCOUNTER — PATIENT MESSAGE (OUTPATIENT)
Dept: INTERNAL MEDICINE | Facility: CLINIC | Age: 59
End: 2024-06-10
Payer: COMMERCIAL

## 2024-10-31 ENCOUNTER — LAB VISIT (OUTPATIENT)
Dept: LAB | Facility: HOSPITAL | Age: 59
End: 2024-10-31
Attending: INTERNAL MEDICINE
Payer: COMMERCIAL

## 2024-10-31 ENCOUNTER — PATIENT MESSAGE (OUTPATIENT)
Dept: INTERNAL MEDICINE | Facility: CLINIC | Age: 59
End: 2024-10-31
Payer: COMMERCIAL

## 2024-10-31 DIAGNOSIS — Z00.00 ANNUAL PHYSICAL EXAM: ICD-10-CM

## 2024-10-31 DIAGNOSIS — E87.5 HYPERKALEMIA: Primary | ICD-10-CM

## 2024-10-31 LAB
25(OH)D3+25(OH)D2 SERPL-MCNC: 34 NG/ML (ref 30–96)
ALBUMIN SERPL BCP-MCNC: 4.3 G/DL (ref 3.5–5.2)
ALP SERPL-CCNC: 76 U/L (ref 40–150)
ALT SERPL W/O P-5'-P-CCNC: 12 U/L (ref 10–44)
ANION GAP SERPL CALC-SCNC: 10 MMOL/L (ref 8–16)
AST SERPL-CCNC: 20 U/L (ref 10–40)
BILIRUB SERPL-MCNC: 0.4 MG/DL (ref 0.1–1)
BUN SERPL-MCNC: 15 MG/DL (ref 6–20)
CALCIUM SERPL-MCNC: 10.4 MG/DL (ref 8.7–10.5)
CHLORIDE SERPL-SCNC: 107 MMOL/L (ref 95–110)
CHOLEST SERPL-MCNC: 137 MG/DL (ref 120–199)
CHOLEST/HDLC SERPL: 3.4 {RATIO} (ref 2–5)
CO2 SERPL-SCNC: 24 MMOL/L (ref 23–29)
CREAT SERPL-MCNC: 0.9 MG/DL (ref 0.5–1.4)
EST. GFR  (NO RACE VARIABLE): >60 ML/MIN/1.73 M^2
ESTIMATED AVG GLUCOSE: 100 MG/DL (ref 68–131)
GLUCOSE SERPL-MCNC: 91 MG/DL (ref 70–110)
HBA1C MFR BLD: 5.1 % (ref 4–5.6)
HDLC SERPL-MCNC: 40 MG/DL (ref 40–75)
HDLC SERPL: 29.2 % (ref 20–50)
LDLC SERPL CALC-MCNC: 53.4 MG/DL (ref 63–159)
MAGNESIUM SERPL-MCNC: 2.1 MG/DL (ref 1.6–2.6)
NONHDLC SERPL-MCNC: 97 MG/DL
POTASSIUM SERPL-SCNC: 5.2 MMOL/L (ref 3.5–5.1)
PROT SERPL-MCNC: 7.9 G/DL (ref 6–8.4)
SODIUM SERPL-SCNC: 141 MMOL/L (ref 136–145)
TRIGL SERPL-MCNC: 218 MG/DL (ref 30–150)
VIT B12 SERPL-MCNC: 379 PG/ML (ref 210–950)

## 2024-10-31 PROCEDURE — 82306 VITAMIN D 25 HYDROXY: CPT | Performed by: INTERNAL MEDICINE

## 2024-10-31 PROCEDURE — 80061 LIPID PANEL: CPT | Performed by: INTERNAL MEDICINE

## 2024-10-31 PROCEDURE — 82607 VITAMIN B-12: CPT | Performed by: INTERNAL MEDICINE

## 2024-10-31 PROCEDURE — 83735 ASSAY OF MAGNESIUM: CPT | Performed by: INTERNAL MEDICINE

## 2024-10-31 PROCEDURE — 83036 HEMOGLOBIN GLYCOSYLATED A1C: CPT | Performed by: INTERNAL MEDICINE

## 2024-10-31 PROCEDURE — 80053 COMPREHEN METABOLIC PANEL: CPT | Performed by: INTERNAL MEDICINE

## 2024-11-05 ENCOUNTER — LAB VISIT (OUTPATIENT)
Dept: LAB | Facility: HOSPITAL | Age: 59
End: 2024-11-05
Attending: INTERNAL MEDICINE
Payer: COMMERCIAL

## 2024-11-05 DIAGNOSIS — E87.5 HYPERKALEMIA: ICD-10-CM

## 2024-11-05 LAB
ANION GAP SERPL CALC-SCNC: 11 MMOL/L (ref 8–16)
BUN SERPL-MCNC: 15 MG/DL (ref 6–20)
CALCIUM SERPL-MCNC: 10 MG/DL (ref 8.7–10.5)
CHLORIDE SERPL-SCNC: 104 MMOL/L (ref 95–110)
CO2 SERPL-SCNC: 24 MMOL/L (ref 23–29)
CREAT SERPL-MCNC: 0.8 MG/DL (ref 0.5–1.4)
EST. GFR  (NO RACE VARIABLE): >60 ML/MIN/1.73 M^2
GLUCOSE SERPL-MCNC: 86 MG/DL (ref 70–110)
POTASSIUM SERPL-SCNC: 4.4 MMOL/L (ref 3.5–5.1)
SODIUM SERPL-SCNC: 139 MMOL/L (ref 136–145)

## 2024-11-05 PROCEDURE — 36415 COLL VENOUS BLD VENIPUNCTURE: CPT | Mod: PO | Performed by: INTERNAL MEDICINE

## 2024-11-05 PROCEDURE — 80048 BASIC METABOLIC PNL TOTAL CA: CPT | Performed by: INTERNAL MEDICINE

## 2024-11-06 ENCOUNTER — OFFICE VISIT (OUTPATIENT)
Dept: INTERNAL MEDICINE | Facility: CLINIC | Age: 59
End: 2024-11-06
Payer: COMMERCIAL

## 2024-11-06 VITALS
HEIGHT: 66 IN | HEART RATE: 87 BPM | OXYGEN SATURATION: 97 % | BODY MASS INDEX: 25.26 KG/M2 | DIASTOLIC BLOOD PRESSURE: 82 MMHG | WEIGHT: 157.19 LBS | SYSTOLIC BLOOD PRESSURE: 126 MMHG

## 2024-11-06 DIAGNOSIS — Z00.00 ANNUAL PHYSICAL EXAM: Primary | ICD-10-CM

## 2024-11-06 DIAGNOSIS — E78.2 MIXED HYPERLIPIDEMIA: ICD-10-CM

## 2024-11-06 DIAGNOSIS — K21.00 GASTROESOPHAGEAL REFLUX DISEASE WITH ESOPHAGITIS, UNSPECIFIED WHETHER HEMORRHAGE: ICD-10-CM

## 2024-11-06 PROBLEM — E03.9 HYPOTHYROIDISM: Status: ACTIVE | Noted: 2024-11-06

## 2024-11-06 PROCEDURE — 3074F SYST BP LT 130 MM HG: CPT | Mod: CPTII,S$GLB,, | Performed by: PHYSICIAN ASSISTANT

## 2024-11-06 PROCEDURE — 3079F DIAST BP 80-89 MM HG: CPT | Mod: CPTII,S$GLB,, | Performed by: PHYSICIAN ASSISTANT

## 2024-11-06 PROCEDURE — 99396 PREV VISIT EST AGE 40-64: CPT | Mod: S$GLB,,, | Performed by: PHYSICIAN ASSISTANT

## 2024-11-06 PROCEDURE — 3008F BODY MASS INDEX DOCD: CPT | Mod: CPTII,S$GLB,, | Performed by: PHYSICIAN ASSISTANT

## 2024-11-06 PROCEDURE — 99999 PR PBB SHADOW E&M-EST. PATIENT-LVL III: CPT | Mod: PBBFAC,,, | Performed by: PHYSICIAN ASSISTANT

## 2024-11-06 PROCEDURE — 3044F HG A1C LEVEL LT 7.0%: CPT | Mod: CPTII,S$GLB,, | Performed by: PHYSICIAN ASSISTANT

## 2024-11-06 PROCEDURE — 1159F MED LIST DOCD IN RCRD: CPT | Mod: CPTII,S$GLB,, | Performed by: PHYSICIAN ASSISTANT

## 2024-11-06 NOTE — PROGRESS NOTES
Internal Medicine Annual Exam       CHIEF COMPLAINT     The patient, Ivonne Arevalo, who is a 59 y.o. female presents for an annual exam.    HPI     PCP is Shari Holman MD, patient is new to me.     Patient presents for annual exam.   She has no complaints.     HM:  She is aware that cervical cancer screening is overdue - she reports she will schedule an apt with her GYN.   She politely declines Flu and COVID vaccines today  She is aware mammogram is due in Jan of 2025.     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 Burnett (excision)    Stress incontinence        Past Surgical History:   Procedure Laterality Date    BONE MARROW BIOPSY Left 6/28/2018    Procedure: Biopsy-bone marrow;  Surgeon: Saul Freitas MD;  Location: [facility] OR [address] [place] [address];  Service: Oncology;  Laterality: Left;    CERVICAL DISC ARTHROPLASTY  2008    ENDOMETRIAL ABLATION      ESOPHAGOGASTRODUODENOSCOPY N/A 3/27/2023    Procedure: ESOPHAGOGASTRODUODENOSCOPY (EGD);  Surgeon: Mundo George MD;  Location: [facility] of [facility] ENDOSCOPY;  Service: Endoscopy;  Laterality: N/A;  Instructions to portal.EC  3/24 pre-call complete, pt stated she would have a ride -CE    hammertoe surgery  4/26/15    right    TOE SURGERY Right     TOTAL SHOULDER ARTHROPLASTY  2008        Family History   Problem Relation Name Age of Onset    Depression Mother Alana Froeba     Hypertension Mother Alana Froeba     Heart disease Mother Alana Froeba     Stroke Mother Alana Froeba     Alzheimer's disease Mother Alana Froeba     Diabetes Father Ariel Froeba     Heart disease Father Ariel Froeba     Hypertension Father Ariel Froeba     Kidney disease Father Ariel Froeba     Cataracts Father Ariel Froeba     Cirrhosis Father Ariel Froeba      Hyperlipidemia Sister      Arthritis Brother Michael Froilianaa     Coronary artery disease Brother Michael Cartagena 42             Hypertension Brother Michael Froeba     Heart attack Brother Michael Froeba     Heart disease Brother Michael Froeba     Heart disease Maternal Aunt      Hypertension Maternal Aunt      Cancer Maternal Uncle Vahe [place]         unknown primary    Heart disease Maternal Uncle Vahe [place]     Hypertension Maternal Uncle Vahe [place]     Arthritis Maternal Grandmother Rosa [place]     Diabetes Maternal Grandmother Rosa [place]     Heart disease Maternal Grandmother Rosa [place]     Hypertension Maternal Grandmother Rosa [place]     Stroke Sister Suzanne Preston 50    Coronary artery disease Sister Suzanne Preston 56    Hyperlipidemia Sister Suzanne Preston     Diabetes Paternal Grandmother [place] Froeba     Heart disease Paternal Grandmother Gina Froeba     Amblyopia Neg Hx      Blindness Neg Hx      Glaucoma Neg Hx      Macular degeneration Neg Hx      Strabismus Neg Hx      Retinal detachment Neg Hx          Social History     Socioeconomic History    Marital status:     Years of education: 2   Occupational History    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   Tobacco Use    Smoking status: Never    Smokeless tobacco: Never   Substance and Sexual Activity    Alcohol use: Yes     Alcohol/week: 2.0 standard drinks of alcohol     Types: 2 Glasses of wine per week    Drug use: No    Sexual activity: Yes     Partners: Male     Birth control/protection: Post-menopausal   Social History Narrative    Walking for exercise     Social Drivers of Health     Financial Resource Strain: Low Risk  (10/30/2024)    Overall Financial Resource Strain (CARDIA)     Difficulty of Paying Living Expenses: Not hard at all   Food Insecurity: No Food Insecurity (10/30/2024)    Hunger Vital Sign     Worried About Running Out of Food in the Last Year: Never true     Ran Out of Food  in the Last Year: Never true   Physical Activity: Inactive (10/30/2024)    Exercise Vital Sign     Days of Exercise per Week: 0 days     Minutes of Exercise per Session: 30 min   Stress: No Stress Concern Present (10/30/2024)    [employer] - Occupational Stress Questionnaire     Feeling of Stress : Not at all   Housing Stability: Unknown (10/30/2024)    Housing Stability Vital Sign     Unable to Pay for Housing in the Last Year: No        Social History     Tobacco Use   Smoking Status Never   Smokeless Tobacco Never        Allergies as of 11/06/2024    (No Known Allergies)          Home Medications:  Prior to Admission medications    Medication Sig Start Date End Date Taking? Authorizing Provider   aspirin (ECOTRIN) 81 MG EC tablet Take 81 mg by mouth once daily.   Yes Provider, Historical   ezetimibe-simvastatin 10-40 mg (VYTORIN) 10-40 mg per tablet Take 1 tablet by mouth every evening. 11/16/23 11/15/24 Yes Myra Begum MD   finasteride (PROSCAR) 5 mg tablet Take 5 mg by mouth once daily. 9/24/22  Yes Provider, Historical   levothyroxine (SYNTHROID) 50 MCG tablet TAKE 1 TABLET BY MOUTH EVERY DAY 1/9/24  Yes Ina Cisneros PA-C   minoxidiL (LONITEN) 2.5 MG tablet Take 2.5 mg by mouth once daily.   Yes Provider, Historical   montelukast (SINGULAIR) 10 mg tablet TAKE 1 TABLET BY MOUTH EVERY DAY IN THE EVENING 1/8/24  Yes Shari Holman MD   omeprazole (PRILOSEC) 20 MG capsule Take 1 capsule (20 mg total) by mouth once daily. 11/15/23  Yes Shari Holman MD   spironolactone (ALDACTONE) 100 MG tablet Take 200 mg by mouth once daily. 2/12/15  Yes Provider, Historical       Review of Systems:  Review of Systems   Constitutional:  Negative for activity change, chills, fever and unexpected weight change.   HENT:  Negative for hearing loss, rhinorrhea, sore throat and trouble swallowing.    Eyes:  Negative for discharge and visual disturbance.   Respiratory:  Negative for cough,  "chest tightness, shortness of breath and wheezing.    Cardiovascular:  Negative for chest pain and palpitations.   Gastrointestinal:  Negative for abdominal pain, blood in stool, constipation, diarrhea, nausea and vomiting.   Endocrine: Negative for polydipsia and polyuria.   Genitourinary:  Negative for difficulty urinating, dysuria, flank pain, hematuria and menstrual problem.   Musculoskeletal:  Negative for arthralgias, back pain, joint swelling, neck pain and neck stiffness.   Skin:  Negative for rash.   Neurological:  Negative for dizziness, syncope, weakness and headaches.   Psychiatric/Behavioral:  Negative for confusion and dysphoric mood.        Health Maintainence:   Immunizations:  Health Maintenance         Date Due Completion Date    Cervical Cancer Screening 04/26/2019 4/26/2016    Override on 11/14/2013: Done    COVID-19 Vaccine (3 - Pfizer risk series) 08/19/2021 7/22/2021    Influenza Vaccine (1) 09/01/2024 12/4/2023    Mammogram 01/03/2025 1/3/2024    Override on 12/18/2017: Done ([facility]    Override on 12/14/2016: Done (at [facility])    Override on 11/11/2015: Done    Override on 10/13/2014: Done    Override on 11/14/2013: Done    Pneumococcal Vaccines (Age 0-64) (1 of 2 - PCV) 01/31/2030 (Originally 1/31/1971) ---    Colorectal Cancer Screening 08/05/2026 8/5/2021    Override on 4/21/2016: Declined (scheduled may 3rd with dr salgado)    Hemoglobin A1c (Diabetic Prevention Screening) 10/31/2027 10/31/2024    TETANUS VACCINE 07/15/2029 7/15/2019    Lipid Panel 10/31/2029 10/31/2024    RSV Vaccine (Age 60+ and Pregnant patients) (1 - 1-dose 75+ series) 01/31/2040 ---             PHYSICAL EXAM     /82 (BP Location: Left arm, Patient Position: Sitting)   Pulse 87   Ht 5' 5.5" (1.664 m)   Wt 71.3 kg (157 lb 3 oz)   SpO2 97%   BMI 25.76 kg/m²  Body mass index is 25.76 kg/m².    Physical Exam  Vitals and nursing note reviewed.   Constitutional:       Appearance: Normal " appearance.      Comments: Healthy appearing female in NAD or apparent pain. She makes good eye contact, speaks in clear full sentences and ambulates with ease.        HENT:      Head: Normocephalic and atraumatic.      Nose: Nose normal.      Mouth/Throat:      Pharynx: Oropharynx is clear.   Eyes:      Conjunctiva/sclera: Conjunctivae normal.   Cardiovascular:      Rate and Rhythm: Normal rate and regular rhythm.      Pulses: Normal pulses.   Pulmonary:      Effort: No respiratory distress.   Abdominal:      Tenderness: There is no abdominal tenderness.   Musculoskeletal:         General: Normal range of motion.      Cervical back: No rigidity.   Skin:     General: Skin is warm and dry.      Capillary Refill: Capillary refill takes less than 2 seconds.      Findings: No rash.   Neurological:      General: No focal deficit present.      Mental Status: She is alert.      Gait: Gait normal.   Psychiatric:         Mood and Affect: Mood normal.         LABS     Lab Results   Component Value Date    HGBA1C 5.1 10/31/2024     CMP  Sodium   Date Value Ref Range Status   11/05/2024 139 136 - 145 mmol/L Final     Potassium   Date Value Ref Range Status   11/05/2024 4.4 3.5 - 5.1 mmol/L Final     Chloride   Date Value Ref Range Status   11/05/2024 104 95 - 110 mmol/L Final     CO2   Date Value Ref Range Status   11/05/2024 24 23 - 29 mmol/L Final     Glucose   Date Value Ref Range Status   11/05/2024 86 70 - 110 mg/dL Final     BUN   Date Value Ref Range Status   11/05/2024 15 6 - 20 mg/dL Final     Creatinine   Date Value Ref Range Status   11/05/2024 0.8 0.5 - 1.4 mg/dL Final     Calcium   Date Value Ref Range Status   11/05/2024 10.0 8.7 - 10.5 mg/dL Final     Total Protein   Date Value Ref Range Status   10/31/2024 7.9 6.0 - 8.4 g/dL Final     Albumin   Date Value Ref Range Status   10/31/2024 4.3 3.5 - 5.2 g/dL Final     Total Bilirubin   Date Value Ref Range Status   10/31/2024 0.4 0.1 - 1.0 mg/dL Final     Comment:      For infants and newborns, interpretation of results should be based  on gestational age, weight and in agreement with clinical  observations.    Premature Infant recommended reference ranges:  Up to 24 hours.............<8.0 mg/dL  Up to 48 hours............<12.0 mg/dL  3-5 days..................<15.0 mg/dL  6-29 days.................<15.0 mg/dL       Alkaline Phosphatase   Date Value Ref Range Status   10/31/2024 76 40 - 150 U/L Final     AST   Date Value Ref Range Status   10/31/2024 20 10 - 40 U/L Final     ALT   Date Value Ref Range Status   10/31/2024 12 10 - 44 U/L Final     Anion Gap   Date Value Ref Range Status   11/05/2024 11 8 - 16 mmol/L Final     eGFR if    Date Value Ref Range Status   10/19/2021 >60.0 >60 mL/min/1.73 m^2 Final     eGFR if non    Date Value Ref Range Status   10/19/2021 >60.0 >60 mL/min/1.73 m^2 Final     Comment:     Calculation used to obtain the estimated glomerular filtration  rate (eGFR) is the CKD-EPI equation.        Lab Results   Component Value Date    WBC 8.74 10/24/2023    HGB 15.7 10/24/2023    HCT 46.5 10/24/2023    MCV 95 10/24/2023     10/24/2023     Lab Results   Component Value Date    CHOL 137 10/31/2024    CHOL 190 10/24/2023    CHOL 225 (H) 10/21/2022     Lab Results   Component Value Date    HDL 40 10/31/2024    HDL 49 10/24/2023    HDL 39 (L) 10/21/2022     Lab Results   Component Value Date    LDLCALC 53.4 (L) 10/31/2024    LDLCALC 85.6 10/24/2023    LDLCALC 149.4 10/21/2022     Lab Results   Component Value Date    TRIG 218 (H) 10/31/2024    TRIG 277 (H) 10/24/2023    TRIG 183 (H) 10/21/2022     Lab Results   Component Value Date    CHOLHDL 29.2 10/31/2024    CHOLHDL 25.8 10/24/2023    CHOLHDL 17.3 (L) 10/21/2022     Lab Results   Component Value Date    TSH 6.853 (H) 10/24/2023       ASSESSMENT/PLAN     Ivonne Arevalo is a 59 y.o. female    Ivonne was seen today for annual exam. She is doing well and has no new complaints.  Labs reviewed today in office, no concerning abnormalities. She did initially have elevated potassium but the repeat lab draw shows normal potassium.     Diagnoses and all orders for this visit:    Annual physical exam    Mixed hyperlipidemia   -well controlled   -continue current regimen     Gastroesophageal reflux disease with esophagitis, unspecified whether hemorrhage   -stable with PPI    -continue regimen       Ina Cisneros PA-C  Department of Internal Medicine - [facility]   8:21 AM

## 2024-11-11 RX ORDER — EZETIMIBE AND SIMVASTATIN 10; 40 MG/1; MG/1
1 TABLET ORAL NIGHTLY
Qty: 90 TABLET | Refills: 3 | Status: SHIPPED | OUTPATIENT
Start: 2024-11-11 | End: 2025-11-11

## 2025-01-08 ENCOUNTER — HOSPITAL ENCOUNTER (OUTPATIENT)
Dept: RADIOLOGY | Facility: HOSPITAL | Age: 60
Discharge: HOME | End: 2025-01-08
Attending: INTERNAL MEDICINE
Payer: COMMERCIAL

## 2025-01-08 DIAGNOSIS — Z12.31 ENCOUNTER FOR SCREENING MAMMOGRAM FOR BREAST CANCER: ICD-10-CM

## 2025-01-08 DIAGNOSIS — E03.9 HYPOTHYROIDISM, UNSPECIFIED TYPE: ICD-10-CM

## 2025-01-08 DIAGNOSIS — R05.9 COUGH: ICD-10-CM

## 2025-01-08 PROCEDURE — 77063 BREAST TOMOSYNTHESIS BI: CPT | Mod: 26,,, | Performed by: RADIOLOGY

## 2025-01-08 PROCEDURE — 77067 SCR MAMMO BI INCL CAD: CPT | Mod: TC

## 2025-01-08 PROCEDURE — 77067 SCR MAMMO BI INCL CAD: CPT | Mod: 26,,, | Performed by: RADIOLOGY

## 2025-01-08 RX ORDER — MONTELUKAST SODIUM 10 MG/1
TABLET ORAL
Qty: 90 TABLET | Refills: 0 | Status: SHIPPED | OUTPATIENT
Start: 2025-01-08

## 2025-01-08 RX ORDER — LEVOTHYROXINE SODIUM 50 UG/1
50 TABLET ORAL
Qty: 90 TABLET | Refills: 3 | Status: SHIPPED | OUTPATIENT
Start: 2025-01-08

## 2025-01-08 NOTE — TELEPHONE ENCOUNTER
Refill Decision Note   Ivonne Irina  is requesting a refill authorization.  Brief Assessment and Rationale for Refill:  Approve     Medication Therapy Plan:         Comments:     Note composed:12:13 AM 01/08/2025

## 2025-01-08 NOTE — TELEPHONE ENCOUNTER
No care due was identified.  [facility] Embedded Care Due Messages. Reference number: [id number].   1/08/2025 12:03:51 AM CST

## 2025-04-15 DIAGNOSIS — K21.9 GASTROESOPHAGEAL REFLUX DISEASE, UNSPECIFIED WHETHER ESOPHAGITIS PRESENT: ICD-10-CM

## 2025-04-15 DIAGNOSIS — R05.9 COUGH: ICD-10-CM

## 2025-04-15 NOTE — TELEPHONE ENCOUNTER
Refill Routing Note   Medication(s) are not appropriate for processing by [facility] for the following reason(s):        Patient not seen by provider within 15 months    [facility] action(s):  Defer   Requires appointment : Yes               Appointments  past 12m or future 3m with PCP    Date Provider   Last Visit   11/1/2023 Shari Holman MD   Next Visit   Visit date not found Shari Holman MD   ED visits in past 90 days: 0        Note composed:1:12 PM 04/15/2025

## 2025-04-15 NOTE — TELEPHONE ENCOUNTER
Care Due:                  Date            Visit Type   Department     Provider  --------------------------------------------------------------------------------                                MYCHART                              ANNUAL                              CHECKUP/PHY  [facility] INTERNAL  Shari Salinas  Last Visit: 11-      S            MEDICINE       [place]  Next Visit: None Scheduled  None         None Found                                                            Last  Test          Frequency    Reason                     Performed    Due Date  --------------------------------------------------------------------------------    Office Visit  15 months..  montelukast, omeprazole..  11- 01-    [facility] Embedded Care Due Messages. Reference number: [id number].   4/15/2025 10:40:13 AM CDT

## 2025-04-16 RX ORDER — MONTELUKAST SODIUM 10 MG/1
10 TABLET ORAL NIGHTLY
Qty: 90 TABLET | Refills: 0 | Status: SHIPPED | OUTPATIENT
Start: 2025-04-16

## 2025-04-16 RX ORDER — OMEPRAZOLE 20 MG/1
20 CAPSULE, DELAYED RELEASE ORAL DAILY
Qty: 90 CAPSULE | Refills: 0 | Status: SHIPPED | OUTPATIENT
Start: 2025-04-16

## 2025-07-14 DIAGNOSIS — R05.9 COUGH: ICD-10-CM

## 2025-07-14 DIAGNOSIS — K21.9 GASTROESOPHAGEAL REFLUX DISEASE, UNSPECIFIED WHETHER ESOPHAGITIS PRESENT: ICD-10-CM

## 2025-07-16 RX ORDER — MONTELUKAST SODIUM 10 MG/1
10 TABLET ORAL NIGHTLY
Qty: 90 TABLET | Refills: 0 | Status: SHIPPED | OUTPATIENT
Start: 2025-07-16

## 2025-07-16 RX ORDER — OMEPRAZOLE 20 MG/1
20 CAPSULE, DELAYED RELEASE ORAL DAILY
Qty: 90 CAPSULE | Refills: 0 | Status: SHIPPED | OUTPATIENT
Start: 2025-07-16

## (undated) DEVICE — NDL SPINAL 20GX3.5 HUB

## (undated) DEVICE — SEE MEDLINE ITEM 157128

## (undated) DEVICE — SYR SLIP TIP 10ML SHIELD